# Patient Record
Sex: FEMALE | Race: BLACK OR AFRICAN AMERICAN | Employment: UNEMPLOYED | URBAN - METROPOLITAN AREA
[De-identification: names, ages, dates, MRNs, and addresses within clinical notes are randomized per-mention and may not be internally consistent; named-entity substitution may affect disease eponyms.]

---

## 2020-03-31 ENCOUNTER — APPOINTMENT (OUTPATIENT)
Dept: CT IMAGING | Age: 60
DRG: 871 | End: 2020-03-31
Payer: COMMERCIAL

## 2020-03-31 ENCOUNTER — APPOINTMENT (OUTPATIENT)
Dept: GENERAL RADIOLOGY | Age: 60
DRG: 871 | End: 2020-03-31
Payer: COMMERCIAL

## 2020-03-31 ENCOUNTER — HOSPITAL ENCOUNTER (INPATIENT)
Age: 60
LOS: 9 days | Discharge: HOME | DRG: 871 | End: 2020-04-09
Attending: EMERGENCY MEDICINE | Admitting: INTERNAL MEDICINE
Payer: COMMERCIAL

## 2020-03-31 PROBLEM — R09.02 HYPOXIA: Status: ACTIVE | Noted: 2020-03-31

## 2020-03-31 PROBLEM — E87.1 HYPONATREMIA: Status: ACTIVE | Noted: 2020-03-31

## 2020-03-31 PROBLEM — J18.9 MULTIFOCAL PNEUMONIA: Status: ACTIVE | Noted: 2020-03-31

## 2020-03-31 PROBLEM — E11.9 TYPE 2 DIABETES MELLITUS WITHOUT COMPLICATION, WITHOUT LONG-TERM CURRENT USE OF INSULIN (HCC): Status: ACTIVE | Noted: 2020-03-31

## 2020-03-31 LAB
-: NORMAL
ABSOLUTE EOS #: 0 K/UL (ref 0–0.4)
ABSOLUTE IMMATURE GRANULOCYTE: 0 K/UL (ref 0–0.3)
ABSOLUTE LYMPH #: 1.5 K/UL (ref 1–4.8)
ABSOLUTE MONO #: 0.07 K/UL (ref 0.1–0.8)
ADENOVIRUS PCR: NOT DETECTED
ALBUMIN SERPL-MCNC: 3.6 G/DL (ref 3.5–5.2)
ALBUMIN/GLOBULIN RATIO: 0.8 (ref 1–2.5)
ALP BLD-CCNC: 92 U/L (ref 35–104)
ALT SERPL-CCNC: 19 U/L (ref 5–33)
AMORPHOUS: NORMAL
ANION GAP SERPL CALCULATED.3IONS-SCNC: 17 MMOL/L (ref 9–17)
AST SERPL-CCNC: 35 U/L
BACTERIA: NORMAL
BASOPHILS # BLD: 0 % (ref 0–2)
BASOPHILS ABSOLUTE: 0 K/UL (ref 0–0.2)
BILIRUB SERPL-MCNC: 0.35 MG/DL (ref 0.3–1.2)
BILIRUBIN URINE: NEGATIVE
BORDETELLA PARAPERTUSSIS: NOT DETECTED
BORDETELLA PERTUSSIS PCR: NOT DETECTED
BUN BLDV-MCNC: 9 MG/DL (ref 8–23)
BUN/CREAT BLD: ABNORMAL (ref 9–20)
C-REACTIVE PROTEIN: 113.1 MG/L (ref 0–5)
CALCIUM SERPL-MCNC: 9.5 MG/DL (ref 8.6–10.4)
CASTS UA: NORMAL /LPF (ref 0–8)
CHLAMYDIA PNEUMONIAE BY PCR: NOT DETECTED
CHLORIDE BLD-SCNC: 90 MMOL/L (ref 98–107)
CO2: 22 MMOL/L (ref 20–31)
COLOR: YELLOW
COMMENT UA: ABNORMAL
CORONAVIRUS 229E PCR: NOT DETECTED
CORONAVIRUS HKU1 PCR: NOT DETECTED
CORONAVIRUS NL63 PCR: NOT DETECTED
CORONAVIRUS OC43 PCR: NOT DETECTED
CREAT SERPL-MCNC: 0.64 MG/DL (ref 0.5–0.9)
CRYSTALS, UA: NORMAL /HPF
DIFFERENTIAL TYPE: ABNORMAL
EOSINOPHILS RELATIVE PERCENT: 0 % (ref 1–4)
EPITHELIAL CELLS UA: NORMAL /HPF (ref 0–5)
FERRITIN: 1205 UG/L (ref 13–150)
GFR AFRICAN AMERICAN: >60 ML/MIN
GFR NON-AFRICAN AMERICAN: >60 ML/MIN
GFR SERPL CREATININE-BSD FRML MDRD: ABNORMAL ML/MIN/{1.73_M2}
GFR SERPL CREATININE-BSD FRML MDRD: ABNORMAL ML/MIN/{1.73_M2}
GLUCOSE BLD-MCNC: 276 MG/DL (ref 65–105)
GLUCOSE BLD-MCNC: 332 MG/DL (ref 70–99)
GLUCOSE URINE: ABNORMAL
HCT VFR BLD CALC: 46.3 % (ref 36.3–47.1)
HEMOGLOBIN: 15.2 G/DL (ref 11.9–15.1)
HUMAN METAPNEUMOVIRUS PCR: NOT DETECTED
IMMATURE GRANULOCYTES: 0 %
INFLUENZA A BY PCR: NOT DETECTED
INFLUENZA A H1 (2009) PCR: NORMAL
INFLUENZA A H1 PCR: NORMAL
INFLUENZA A H3 PCR: NORMAL
INFLUENZA B BY PCR: NOT DETECTED
KETONES, URINE: ABNORMAL
LACTIC ACID, SEPSIS WHOLE BLOOD: 2.3 MMOL/L (ref 0.5–1.9)
LACTIC ACID, SEPSIS WHOLE BLOOD: 3.6 MMOL/L (ref 0.5–1.9)
LACTIC ACID, SEPSIS: ABNORMAL MMOL/L (ref 0.5–1.9)
LACTIC ACID, SEPSIS: ABNORMAL MMOL/L (ref 0.5–1.9)
LEUKOCYTE ESTERASE, URINE: NEGATIVE
LYMPHOCYTES # BLD: 23 % (ref 24–44)
MCH RBC QN AUTO: 31.7 PG (ref 25.2–33.5)
MCHC RBC AUTO-ENTMCNC: 32.8 G/DL (ref 28.4–34.8)
MCV RBC AUTO: 96.7 FL (ref 82.6–102.9)
MONOCYTES # BLD: 1 % (ref 1–7)
MORPHOLOGY: NORMAL
MUCUS: NORMAL
MYCOPLASMA PNEUMONIAE PCR: NOT DETECTED
NITRITE, URINE: NEGATIVE
NRBC AUTOMATED: 0 PER 100 WBC
OTHER OBSERVATIONS UA: NORMAL
PARAINFLUENZA 1 PCR: NOT DETECTED
PARAINFLUENZA 2 PCR: NOT DETECTED
PARAINFLUENZA 3 PCR: NOT DETECTED
PARAINFLUENZA 4 PCR: NOT DETECTED
PDW BLD-RTO: 11 % (ref 11.8–14.4)
PH UA: 7 (ref 5–8)
PLATELET # BLD: 242 K/UL (ref 138–453)
PLATELET ESTIMATE: ABNORMAL
PMV BLD AUTO: 10.8 FL (ref 8.1–13.5)
POTASSIUM SERPL-SCNC: 4.4 MMOL/L (ref 3.7–5.3)
PROCALCITONIN: 0.19 NG/ML
PROTEIN UA: ABNORMAL
RBC # BLD: 4.79 M/UL (ref 3.95–5.11)
RBC # BLD: ABNORMAL 10*6/UL
RBC UA: NORMAL /HPF (ref 0–4)
RENAL EPITHELIAL, UA: NORMAL /HPF
RESP SYNCYTIAL VIRUS PCR: NOT DETECTED
RHINO/ENTEROVIRUS PCR: NOT DETECTED
SEG NEUTROPHILS: 76 % (ref 36–66)
SEGMENTED NEUTROPHILS ABSOLUTE COUNT: 4.93 K/UL (ref 1.8–7.7)
SODIUM BLD-SCNC: 129 MMOL/L (ref 135–144)
SPECIFIC GRAVITY UA: 1.05 (ref 1–1.03)
SPECIMEN DESCRIPTION: NORMAL
TOTAL PROTEIN: 8.3 G/DL (ref 6.4–8.3)
TRICHOMONAS: NORMAL
TROPONIN INTERP: NORMAL
TROPONIN T: NORMAL NG/ML
TROPONIN, HIGH SENSITIVITY: <6 NG/L (ref 0–14)
TURBIDITY: CLEAR
URINE HGB: NEGATIVE
UROBILINOGEN, URINE: NORMAL
WBC # BLD: 6.5 K/UL (ref 3.5–11.3)
WBC # BLD: ABNORMAL 10*3/UL
WBC UA: NORMAL /HPF (ref 0–5)
YEAST: NORMAL

## 2020-03-31 PROCEDURE — 80053 COMPREHEN METABOLIC PANEL: CPT

## 2020-03-31 PROCEDURE — 2060000000 HC ICU INTERMEDIATE R&B

## 2020-03-31 PROCEDURE — 86140 C-REACTIVE PROTEIN: CPT

## 2020-03-31 PROCEDURE — 87205 SMEAR GRAM STAIN: CPT

## 2020-03-31 PROCEDURE — 99223 1ST HOSP IP/OBS HIGH 75: CPT | Performed by: NURSE PRACTITIONER

## 2020-03-31 PROCEDURE — 71260 CT THORAX DX C+: CPT

## 2020-03-31 PROCEDURE — 81001 URINALYSIS AUTO W/SCOPE: CPT

## 2020-03-31 PROCEDURE — 2580000003 HC RX 258: Performed by: STUDENT IN AN ORGANIZED HEALTH CARE EDUCATION/TRAINING PROGRAM

## 2020-03-31 PROCEDURE — 0100U HC RESPIRPTHGN MULT REV TRANS & AMP PRB TECH 21 TRGT: CPT

## 2020-03-31 PROCEDURE — 85025 COMPLETE CBC W/AUTO DIFF WBC: CPT

## 2020-03-31 PROCEDURE — 6360000004 HC RX CONTRAST MEDICATION: Performed by: STUDENT IN AN ORGANIZED HEALTH CARE EDUCATION/TRAINING PROGRAM

## 2020-03-31 PROCEDURE — 6370000000 HC RX 637 (ALT 250 FOR IP): Performed by: STUDENT IN AN ORGANIZED HEALTH CARE EDUCATION/TRAINING PROGRAM

## 2020-03-31 PROCEDURE — 86738 MYCOPLASMA ANTIBODY: CPT

## 2020-03-31 PROCEDURE — U0002 COVID-19 LAB TEST NON-CDC: HCPCS

## 2020-03-31 PROCEDURE — 99254 IP/OBS CNSLTJ NEW/EST MOD 60: CPT | Performed by: INTERNAL MEDICINE

## 2020-03-31 PROCEDURE — 82947 ASSAY GLUCOSE BLOOD QUANT: CPT

## 2020-03-31 PROCEDURE — 83615 LACTATE (LD) (LDH) ENZYME: CPT

## 2020-03-31 PROCEDURE — 2580000003 HC RX 258: Performed by: PHYSICIAN ASSISTANT

## 2020-03-31 PROCEDURE — 83605 ASSAY OF LACTIC ACID: CPT

## 2020-03-31 PROCEDURE — 6360000002 HC RX W HCPCS: Performed by: PHYSICIAN ASSISTANT

## 2020-03-31 PROCEDURE — 93005 ELECTROCARDIOGRAM TRACING: CPT | Performed by: STUDENT IN AN ORGANIZED HEALTH CARE EDUCATION/TRAINING PROGRAM

## 2020-03-31 PROCEDURE — 84484 ASSAY OF TROPONIN QUANT: CPT

## 2020-03-31 PROCEDURE — 84145 PROCALCITONIN (PCT): CPT

## 2020-03-31 PROCEDURE — 87070 CULTURE OTHR SPECIMN AEROBIC: CPT

## 2020-03-31 PROCEDURE — 71045 X-RAY EXAM CHEST 1 VIEW: CPT

## 2020-03-31 PROCEDURE — 82728 ASSAY OF FERRITIN: CPT

## 2020-03-31 PROCEDURE — 99285 EMERGENCY DEPT VISIT HI MDM: CPT

## 2020-03-31 PROCEDURE — 6370000000 HC RX 637 (ALT 250 FOR IP): Performed by: PHYSICIAN ASSISTANT

## 2020-03-31 RX ORDER — SODIUM CHLORIDE, SODIUM LACTATE, POTASSIUM CHLORIDE, AND CALCIUM CHLORIDE .6; .31; .03; .02 G/100ML; G/100ML; G/100ML; G/100ML
1000 INJECTION, SOLUTION INTRAVENOUS ONCE
Status: COMPLETED | OUTPATIENT
Start: 2020-03-31 | End: 2020-03-31

## 2020-03-31 RX ORDER — NICOTINE 21 MG/24HR
1 PATCH, TRANSDERMAL 24 HOURS TRANSDERMAL DAILY PRN
Status: DISCONTINUED | OUTPATIENT
Start: 2020-03-31 | End: 2020-04-09 | Stop reason: HOSPADM

## 2020-03-31 RX ORDER — PROMETHAZINE HYDROCHLORIDE 25 MG/1
12.5 TABLET ORAL EVERY 6 HOURS PRN
Status: DISCONTINUED | OUTPATIENT
Start: 2020-03-31 | End: 2020-04-09 | Stop reason: HOSPADM

## 2020-03-31 RX ORDER — ACETAMINOPHEN 325 MG/1
650 TABLET ORAL ONCE
Status: COMPLETED | OUTPATIENT
Start: 2020-03-31 | End: 2020-03-31

## 2020-03-31 RX ORDER — ACETAMINOPHEN 325 MG/1
650 TABLET ORAL EVERY 6 HOURS PRN
Status: DISCONTINUED | OUTPATIENT
Start: 2020-03-31 | End: 2020-04-09 | Stop reason: HOSPADM

## 2020-03-31 RX ORDER — POTASSIUM CHLORIDE 20 MEQ/1
40 TABLET, EXTENDED RELEASE ORAL PRN
Status: DISCONTINUED | OUTPATIENT
Start: 2020-03-31 | End: 2020-04-09 | Stop reason: HOSPADM

## 2020-03-31 RX ORDER — IPRATROPIUM BROMIDE AND ALBUTEROL SULFATE 2.5; .5 MG/3ML; MG/3ML
1 SOLUTION RESPIRATORY (INHALATION)
Status: DISCONTINUED | OUTPATIENT
Start: 2020-03-31 | End: 2020-04-02

## 2020-03-31 RX ORDER — SODIUM CHLORIDE 0.9 % (FLUSH) 0.9 %
10 SYRINGE (ML) INJECTION PRN
Status: DISCONTINUED | OUTPATIENT
Start: 2020-03-31 | End: 2020-04-09 | Stop reason: HOSPADM

## 2020-03-31 RX ORDER — HYDROXYCHLOROQUINE SULFATE 200 MG/1
400 TABLET, FILM COATED ORAL 2 TIMES DAILY
Status: DISCONTINUED | OUTPATIENT
Start: 2020-03-31 | End: 2020-04-01

## 2020-03-31 RX ORDER — ACETAMINOPHEN 650 MG/1
650 SUPPOSITORY RECTAL EVERY 6 HOURS PRN
Status: DISCONTINUED | OUTPATIENT
Start: 2020-03-31 | End: 2020-04-09 | Stop reason: HOSPADM

## 2020-03-31 RX ORDER — ONDANSETRON 2 MG/ML
4 INJECTION INTRAMUSCULAR; INTRAVENOUS EVERY 6 HOURS PRN
Status: DISCONTINUED | OUTPATIENT
Start: 2020-03-31 | End: 2020-04-09 | Stop reason: HOSPADM

## 2020-03-31 RX ORDER — ALBUTEROL SULFATE 2.5 MG/3ML
2.5 SOLUTION RESPIRATORY (INHALATION)
Status: DISCONTINUED | OUTPATIENT
Start: 2020-03-31 | End: 2020-04-03

## 2020-03-31 RX ORDER — HYDROXYCHLOROQUINE SULFATE 200 MG/1
200 TABLET, FILM COATED ORAL 2 TIMES DAILY
Status: COMPLETED | OUTPATIENT
Start: 2020-04-02 | End: 2020-04-05

## 2020-03-31 RX ORDER — SODIUM CHLORIDE 0.9 % (FLUSH) 0.9 %
10 SYRINGE (ML) INJECTION EVERY 12 HOURS SCHEDULED
Status: DISCONTINUED | OUTPATIENT
Start: 2020-03-31 | End: 2020-04-09 | Stop reason: HOSPADM

## 2020-03-31 RX ORDER — MAGNESIUM SULFATE 1 G/100ML
1 INJECTION INTRAVENOUS PRN
Status: DISCONTINUED | OUTPATIENT
Start: 2020-03-31 | End: 2020-04-09 | Stop reason: HOSPADM

## 2020-03-31 RX ORDER — POTASSIUM CHLORIDE 7.45 MG/ML
10 INJECTION INTRAVENOUS PRN
Status: DISCONTINUED | OUTPATIENT
Start: 2020-03-31 | End: 2020-04-09 | Stop reason: HOSPADM

## 2020-03-31 RX ORDER — SODIUM CHLORIDE 9 MG/ML
INJECTION, SOLUTION INTRAVENOUS CONTINUOUS
Status: DISCONTINUED | OUTPATIENT
Start: 2020-03-31 | End: 2020-04-03

## 2020-03-31 RX ADMIN — ACETAMINOPHEN 650 MG: 325 TABLET ORAL at 15:27

## 2020-03-31 RX ADMIN — SODIUM CHLORIDE, POTASSIUM CHLORIDE, SODIUM LACTATE AND CALCIUM CHLORIDE 1000 ML: 600; 310; 30; 20 INJECTION, SOLUTION INTRAVENOUS at 14:58

## 2020-03-31 RX ADMIN — ENOXAPARIN SODIUM 40 MG: 40 INJECTION SUBCUTANEOUS at 21:13

## 2020-03-31 RX ADMIN — ACETAMINOPHEN 650 MG: 325 TABLET ORAL at 19:03

## 2020-03-31 RX ADMIN — AZITHROMYCIN MONOHYDRATE 500 MG: 500 INJECTION, POWDER, LYOPHILIZED, FOR SOLUTION INTRAVENOUS at 18:48

## 2020-03-31 RX ADMIN — INSULIN LISPRO 3 UNITS: 100 INJECTION, SOLUTION INTRAVENOUS; SUBCUTANEOUS at 18:59

## 2020-03-31 RX ADMIN — SODIUM CHLORIDE: 9 INJECTION, SOLUTION INTRAVENOUS at 18:00

## 2020-03-31 RX ADMIN — IOHEXOL 75 ML: 350 INJECTION, SOLUTION INTRAVENOUS at 15:18

## 2020-03-31 ASSESSMENT — ENCOUNTER SYMPTOMS
COLOR CHANGE: 0
PHOTOPHOBIA: 0
ABDOMINAL PAIN: 0
SHORTNESS OF BREATH: 1
BACK PAIN: 0
SORE THROAT: 0
NAUSEA: 0
VOMITING: 0
COUGH: 1

## 2020-03-31 ASSESSMENT — PAIN DESCRIPTION - LOCATION
LOCATION: GENERALIZED
LOCATION: HEAD
LOCATION: HEAD

## 2020-03-31 ASSESSMENT — PAIN SCALES - GENERAL
PAINLEVEL_OUTOF10: 1
PAINLEVEL_OUTOF10: 6
PAINLEVEL_OUTOF10: 7
PAINLEVEL_OUTOF10: 1

## 2020-03-31 ASSESSMENT — PAIN DESCRIPTION - DESCRIPTORS: DESCRIPTORS: ACHING

## 2020-03-31 NOTE — ED PROVIDER NOTES
[facility]. 2  Emergency Department Encounter  EmergencyMedicine Resident     Pt Name:Marie Cadet  MRN: [id number]  [place] 1960  Date of evaluation: 3/31/20  PCP:  Ruddy Batitsa MD    CHIEF COMPLAINT       Chief Complaint   Patient presents with    Cough    Fever    Shortness of Breath       HISTORY OF PRESENT ILLNESS  (Location/Symptom, Timing/Onset, Context/Setting, Quality, Duration, Modifying Factors, Severity.)      Marie Padron is a 60 y.o. female Patient complaining of 1 week of cough, fever, myalgias. No known sick contacts, no recent travel. Patient denies any nausea, vomiting, diarrhea. No history of COPD or other lung disease. Patient denying any cardiac history. She takes metformin for type 2 diabetes. Recently called PCP and was prescribed albuterol. She states that the albuterol does help her symptoms. Patient did have a dose of albuterol prior to coming in. PAST MEDICAL / SURGICAL / SOCIAL / FAMILY HISTORY      has a past medical history of Diabetes mellitus ([facility] [address] has no past surgical history on file.     Social History     Socioeconomic History    Marital status:      Spouse name: Not on file    Number of children: Not on file    Years of education: Not on file    Highest education level: Not on file   Occupational History    Not on file   Social Needs    Financial resource strain: Not on file    Food insecurity     Worry: Not on file     Inability: Not on file    Transportation needs     Medical: Not on file     Non-medical: Not on file   Tobacco Use    Smoking status: Never Smoker    Smokeless tobacco: Never Used   Substance and Sexual Activity    Alcohol use: Yes     Comment: occasionally    Drug use: Never    Sexual activity: Not on file   Lifestyle    Physical activity     Days per week: Not on file     Minutes per session: Not on file    Stress: Not on file   Relationships    Social connections     Talks on phone: Not on file     Gets together: Not on file     Attends Jew service: Not on file     Active member of club or organization: Not on file     Attends meetings of clubs or organizations: Not on file     Relationship status: Not on file    Intimate partner violence     Fear of current or ex partner: Not on file     Emotionally abused: Not on file     Physically abused: Not on file     Forced sexual activity: Not on file   Other Topics Concern    Not on file   Social History Narrative    Not on file       History reviewed. No pertinent family history. Allergies:  Patient has no known allergies. Home Medications:  Prior to Admission medications    Medication Sig Start Date End Date Taking? Authorizing Provider   metFORMIN (GLUCOPHAGE) 1000 MG tablet Take 1,000 mg by mouth daily (with breakfast)   Yes Historical Provider, MD       REVIEW OF SYSTEMS    (2-9 systems for level 4, 10 or more for level 5)      Review of Systems   Constitutional: Positive for fever. Respiratory: Positive for cough and shortness of breath. Cardiovascular: Negative for chest pain. Gastrointestinal: Negative for abdominal pain, nausea and vomiting. Genitourinary: Negative for dysuria. Musculoskeletal: Negative for myalgias. Skin: Negative for rash. Allergic/Immunologic: Negative for environmental allergies. Neurological: Negative for headaches. Hematological: Does not bruise/bleed easily. Psychiatric/Behavioral: Negative for suicidal ideas. PHYSICAL EXAM   (up to 7 for level 4, 8 or more for level 5)      INITIAL VITALS:   /81   Pulse 94   Temp 100.7 °F (38.2 °C) (Oral)   Resp 29   Ht 5' 7\" (1.702 m)   Wt 137 lb (62.1 kg)   SpO2 98%   BMI 21.46 kg/m²     Physical Exam  Constitutional:       General: She is in acute distress. Appearance: She is well-developed. She is ill-appearing. HENT:      Head: Normocephalic and atraumatic.       Nose: Nose normal.   Eyes:      Pupils: Pupils are equal, round, and reactive to light. Neck:      Musculoskeletal: Normal range of motion and neck supple. Cardiovascular:      Rate and Rhythm: Normal rate and regular rhythm. Heart sounds: Normal heart sounds. Pulmonary:      Effort: No respiratory distress. Comments: Tachypnea w rate or 36  Abdominal:      General: Bowel sounds are normal. There is no distension. Palpations: Abdomen is soft. Tenderness: There is no abdominal tenderness. Musculoskeletal: Normal range of motion. General: No deformity. Skin:     General: Skin is warm and dry. Findings: No erythema or rash. Neurological:      Mental Status: She is alert and oriented to person, place, and time. Deep Tendon Reflexes: Reflexes are normal and symmetric.    Psychiatric:         Behavior: Behavior normal.         DIFFERENTIAL  DIAGNOSIS     PLAN (LABS / IMAGING / EKG):  Orders Placed This Encounter   Procedures    Sputum gram stain    Respiratory Culture    Culture, Blood 1    Culture, Blood 1    Respiratory Virus PCR Panel    CT CHEST PULMONARY EMBOLISM W CONTRAST    XR CHEST PORTABLE    CBC Auto Differential    C-Reactive Protein    Troponin    Lactate, Sepsis    Procalcitonin    Urinalysis Reflex to Culture    Comprehensive Metabolic Panel    Basic Metabolic Panel w/ Reflex to MG    CBC Auto Differential    Microscopic Urinalysis    Ferritin    Lactate Dehydrogenase    Mycoplasma pneumoniae antibody, IgM    DIET CARB CONTROL;    Telemetry monitoring    Continuous Pulse Oximetry    Vital signs per unit routine    Notify physician    Up as tolerated    Daily weights    Intake and output    Tobacco cessation education protocol    Encourage deep breathing and coughing every two hours while awake    Place intermittent pneumatic compression device    Full Code    Inpatient consult to Hospitalist    Consult to Infectious Disease    Consult to Pulmonology    Droplet Plus Isolation    OT eval and treat    PT evaluation and treat    Initiate Oxygen Therapy Protocol    Pulse Oximetry Spot Check    Respiratory care evaluation only    HHN Treatment    HHN Treatment    POC Glucose Fingerstick    EKG 12 Lead    PATIENT STATUS (FROM ED OR OR/PROCEDURAL) Inpatient    PATIENT STATUS (FROM ED OR OR/PROCEDURAL) Inpatient       MEDICATIONS ORDERED:  Orders Placed This Encounter   Medications    lactated ringers bolus    iohexol (OMNIPAQUE 350) solution 75 mL    acetaminophen (TYLENOL) tablet 650 mg    insulin lispro (HUMALOG) injection vial 0-6 Units    insulin lispro (HUMALOG) injection vial 0-3 Units    magnesium sulfate 1 g in dextrose 5% 100 mL IVPB    OR Linked Order Group     potassium chloride (KLOR-CON M) extended release tablet 40 mEq     potassium bicarb-citric acid (EFFER-K) effervescent tablet 40 mEq     potassium chloride 10 mEq/100 mL IVPB (Peripheral Line)    0.9 % sodium chloride infusion    sodium chloride flush 0.9 % injection 10 mL    sodium chloride flush 0.9 % injection 10 mL    OR Linked Order Group     acetaminophen (TYLENOL) tablet 650 mg     acetaminophen (TYLENOL) suppository 650 mg    magnesium hydroxide (MILK OF MAGNESIA) 400 MG/5ML suspension 30 mL    OR Linked Order Group     promethazine (PHENERGAN) tablet 12.5 mg     ondansetron (ZOFRAN) injection 4 mg    nicotine (NICODERM CQ) 21 MG/24HR 1 patch     If indicated/pateint smokes    enoxaparin (LOVENOX) injection 40 mg    albuterol (PROVENTIL) nebulizer solution 2.5 mg    ipratropium-albuterol (DUONEB) nebulizer solution 1 ampule    AND Linked Order Group     azithromycin (ZITHROMAX) 500 mg in dextrose 5% 250 mL IVPB     cefTRIAXone (ROCEPHIN) 1 g IVPB in 50 mL D5W minibag      IVPB       IMPRESSION:     ED Course as of Mar 31 1939   Tue Mar 31, 2020       1328 Patient febrile    [AD]   0312 Patient with low-grade temp of 100.2. Patient coughing on exam.  O2 sats 94 to 96% on room air.   Patient slightly tachycardic likely due to dose of albuterol prior to coming to ED. Patient is breathing rapidly at 37 breaths/min. We will continue to monitor closely and obtain CT of the chest, basic labs, EKG. Will consider possible admission for suspected coronavirus as pt was hypoxic w sat 86 upon arrival.    [AD]   1416 Pt placed on 2L NC with O2 sat of 91%. [AD]   7356 Pt admitted to intermed service for highly suspected COVID infection. Pt has not required additional respiratory support. CT showing viral pattern w ground glass opacities in b/l lower lobes.      [AD]      ED Course User Index  [AD] Nathalia Chacon MD       DIAGNOSTIC RESULTS / EMERGENCY DEPARTMENT COURSE / MDM     LABS:  Results for orders placed or performed during the hospital encounter of 03/31/20   CBC Auto Differential   Result Value Ref Range    WBC 6.5 3.5 - 11.3 k/uL    RBC 4.79 3.95 - 5.11 m/uL    Hemoglobin 15.2 (H) 11.9 - 15.1 g/dL    Hematocrit 46.3 36.3 - 47.1 %    MCV 96.7 82.6 - 102.9 fL    MCH 31.7 25.2 - 33.5 pg    MCHC 32.8 28.4 - 34.8 g/dL    RDW 11.0 (L) 11.8 - 14.4 %    Platelets 439 778 - 781 k/uL    MPV 10.8 8.1 - 13.5 fL    NRBC Automated 0.0 0.0 per 100 WBC    Differential Type NOT REPORTED     WBC Morphology NOT REPORTED     RBC Morphology NOT REPORTED     Platelet Estimate NOT REPORTED     Immature Granulocytes 0 0 %    Seg Neutrophils 76 (H) 36 - 66 %    Lymphocytes 23 (L) 24 - 44 %    Monocytes 1 1 - 7 %    Eosinophils % 0 (L) 1 - 4 %    Basophils 0 0 - 2 %    Absolute Immature Granulocyte 0.00 0.00 - 0.30 k/uL    Segs Absolute 4.93 1.8 - 7.7 k/uL    Absolute Lymph # 1.50 1.0 - 4.8 k/uL    Absolute Mono # 0.07 (L) 0.1 - 0.8 k/uL    Absolute Eos # 0.00 0.0 - 0.4 k/uL    Basophils Absolute 0.00 0.0 - 0.2 k/uL    Morphology Normal    C-Reactive Protein   Result Value Ref Range    .1 (H) 0.0 - 5.0 mg/L   Troponin   Result Value Ref Range    Troponin, High Sensitivity <6 0 - 14 ng/L    Troponin T NOT REPORTED

## 2020-03-31 NOTE — LETTER
[facility]  [id number] [id number]  Phone: [phone]           Outpatient Diabetes Education Program     4/9/20    RE: Clearance Peter Padron  1960  [address] [place], [place] [address] [facility] [place] [address]        Dear Leida Parra MD    As a follow up to a [facility] admission for diabetes related illness or inpatient diabetes education referral, please consider ordering an outpatient diabetes education / MNT to address ongoing diabetes self management needs. Lab Results   Component Value Date    LABA1C 14.2 (H) 04/08/2020    LABA1C 11.8 (H) 11/05/2013       Please place order via [employer]/ mymxlog system:   REF20 - Ambulatory referral to Diabetic Education -  Portia Zamorano  ED      Or   FAX outpatient diabetes education referral to [phone]    Diabetes Education Services  staff will contact the patient to set up assessment and education sessions. Thank you.     Tariq Riggins, RN  CDE

## 2020-04-01 LAB
-: NORMAL
ABSOLUTE EOS #: 0 K/UL (ref 0–0.4)
ABSOLUTE IMMATURE GRANULOCYTE: 0.06 K/UL (ref 0–0.3)
ABSOLUTE LYMPH #: 1.43 K/UL (ref 1–4.8)
ABSOLUTE MONO #: 0.11 K/UL (ref 0.1–0.8)
ANION GAP SERPL CALCULATED.3IONS-SCNC: 13 MMOL/L (ref 9–17)
BASOPHILS # BLD: 0 % (ref 0–2)
BASOPHILS ABSOLUTE: 0 K/UL (ref 0–0.2)
BUN BLDV-MCNC: 6 MG/DL (ref 8–23)
BUN/CREAT BLD: ABNORMAL (ref 9–20)
CALCIUM SERPL-MCNC: 8.3 MG/DL (ref 8.6–10.4)
CHLORIDE BLD-SCNC: 95 MMOL/L (ref 98–107)
CO2: 23 MMOL/L (ref 20–31)
CREAT SERPL-MCNC: 0.5 MG/DL (ref 0.5–0.9)
DIFFERENTIAL TYPE: ABNORMAL
EKG ATRIAL RATE: 102 BPM
EKG ATRIAL RATE: 83 BPM
EKG P AXIS: 50 DEGREES
EKG P AXIS: 52 DEGREES
EKG P-R INTERVAL: 146 MS
EKG P-R INTERVAL: 158 MS
EKG Q-T INTERVAL: 330 MS
EKG Q-T INTERVAL: 368 MS
EKG QRS DURATION: 76 MS
EKG QRS DURATION: 82 MS
EKG QTC CALCULATION (BAZETT): 430 MS
EKG QTC CALCULATION (BAZETT): 432 MS
EKG R AXIS: 18 DEGREES
EKG R AXIS: 51 DEGREES
EKG T AXIS: 39 DEGREES
EKG T AXIS: 61 DEGREES
EKG VENTRICULAR RATE: 102 BPM
EKG VENTRICULAR RATE: 83 BPM
EOSINOPHILS RELATIVE PERCENT: 0 % (ref 1–4)
GFR AFRICAN AMERICAN: >60 ML/MIN
GFR NON-AFRICAN AMERICAN: >60 ML/MIN
GFR SERPL CREATININE-BSD FRML MDRD: ABNORMAL ML/MIN/{1.73_M2}
GFR SERPL CREATININE-BSD FRML MDRD: ABNORMAL ML/MIN/{1.73_M2}
GLUCOSE BLD-MCNC: 243 MG/DL (ref 65–105)
GLUCOSE BLD-MCNC: 264 MG/DL (ref 70–99)
GLUCOSE BLD-MCNC: 266 MG/DL (ref 65–105)
GLUCOSE BLD-MCNC: 281 MG/DL (ref 65–105)
HCT VFR BLD CALC: 39.3 % (ref 36.3–47.1)
HEMOGLOBIN: 13 G/DL (ref 11.9–15.1)
IMMATURE GRANULOCYTES: 1 %
INTERVENTION: NORMAL
LACTATE DEHYDROGENASE: 440 U/L (ref 135–214)
LYMPHOCYTES # BLD: 26 % (ref 24–44)
MCH RBC QN AUTO: 31.5 PG (ref 25.2–33.5)
MCHC RBC AUTO-ENTMCNC: 33.1 G/DL (ref 28.4–34.8)
MCV RBC AUTO: 95.2 FL (ref 82.6–102.9)
MONOCYTES # BLD: 2 % (ref 1–7)
MORPHOLOGY: NORMAL
MYCOPLASMA PNEUMONIAE IGM: 0.31
NRBC AUTOMATED: 0 PER 100 WBC
PDW BLD-RTO: 11.1 % (ref 11.8–14.4)
PLATELET # BLD: 230 K/UL (ref 138–453)
PLATELET ESTIMATE: ABNORMAL
PMV BLD AUTO: 10.5 FL (ref 8.1–13.5)
POTASSIUM SERPL-SCNC: 4 MMOL/L (ref 3.7–5.3)
RBC # BLD: 4.13 M/UL (ref 3.95–5.11)
RBC # BLD: ABNORMAL 10*6/UL
REASON FOR REJECTION: NORMAL
SEG NEUTROPHILS: 71 % (ref 36–66)
SEGMENTED NEUTROPHILS ABSOLUTE COUNT: 3.9 K/UL (ref 1.8–7.7)
SODIUM BLD-SCNC: 131 MMOL/L (ref 135–144)
WBC # BLD: 5.5 K/UL (ref 3.5–11.3)
WBC # BLD: ABNORMAL 10*3/UL
ZZ NTE CLEAN UP: ORDERED TEST: NORMAL
ZZ NTE WITH NAME CLEAN UP: SPECIMEN SOURCE: NORMAL

## 2020-04-01 PROCEDURE — 99232 SBSQ HOSP IP/OBS MODERATE 35: CPT | Performed by: INTERNAL MEDICINE

## 2020-04-01 PROCEDURE — 99233 SBSQ HOSP IP/OBS HIGH 50: CPT | Performed by: INTERNAL MEDICINE

## 2020-04-01 PROCEDURE — 2060000000 HC ICU INTERMEDIATE R&B

## 2020-04-01 PROCEDURE — 93010 ELECTROCARDIOGRAM REPORT: CPT | Performed by: INTERNAL MEDICINE

## 2020-04-01 PROCEDURE — 85025 COMPLETE CBC W/AUTO DIFF WBC: CPT

## 2020-04-01 PROCEDURE — 6370000000 HC RX 637 (ALT 250 FOR IP): Performed by: PHYSICIAN ASSISTANT

## 2020-04-01 PROCEDURE — 2580000003 HC RX 258: Performed by: INTERNAL MEDICINE

## 2020-04-01 PROCEDURE — 82947 ASSAY GLUCOSE BLOOD QUANT: CPT

## 2020-04-01 PROCEDURE — 99254 IP/OBS CNSLTJ NEW/EST MOD 60: CPT | Performed by: INTERNAL MEDICINE

## 2020-04-01 PROCEDURE — 80048 BASIC METABOLIC PNL TOTAL CA: CPT

## 2020-04-01 PROCEDURE — 93005 ELECTROCARDIOGRAM TRACING: CPT | Performed by: INTERNAL MEDICINE

## 2020-04-01 PROCEDURE — 6370000000 HC RX 637 (ALT 250 FOR IP): Performed by: INTERNAL MEDICINE

## 2020-04-01 PROCEDURE — 2580000003 HC RX 258: Performed by: PHYSICIAN ASSISTANT

## 2020-04-01 PROCEDURE — 6360000002 HC RX W HCPCS: Performed by: INTERNAL MEDICINE

## 2020-04-01 PROCEDURE — 6360000002 HC RX W HCPCS: Performed by: PHYSICIAN ASSISTANT

## 2020-04-01 RX ORDER — DEXTROSE MONOHYDRATE 50 MG/ML
100 INJECTION, SOLUTION INTRAVENOUS PRN
Status: DISCONTINUED | OUTPATIENT
Start: 2020-04-01 | End: 2020-04-09 | Stop reason: HOSPADM

## 2020-04-01 RX ORDER — NICOTINE POLACRILEX 4 MG
15 LOZENGE BUCCAL PRN
Status: DISCONTINUED | OUTPATIENT
Start: 2020-04-01 | End: 2020-04-09 | Stop reason: HOSPADM

## 2020-04-01 RX ORDER — DEXTROSE MONOHYDRATE 25 G/50ML
12.5 INJECTION, SOLUTION INTRAVENOUS PRN
Status: DISCONTINUED | OUTPATIENT
Start: 2020-04-01 | End: 2020-04-09 | Stop reason: HOSPADM

## 2020-04-01 RX ORDER — GLIPIZIDE 5 MG/1
5 TABLET ORAL
Status: DISCONTINUED | OUTPATIENT
Start: 2020-04-01 | End: 2020-04-09 | Stop reason: HOSPADM

## 2020-04-01 RX ADMIN — ACETAMINOPHEN 650 MG: 325 TABLET ORAL at 00:51

## 2020-04-01 RX ADMIN — HYDROXYCHLOROQUINE SULFATE 400 MG: 200 TABLET, FILM COATED ORAL at 00:51

## 2020-04-01 RX ADMIN — SODIUM CHLORIDE: 9 INJECTION, SOLUTION INTRAVENOUS at 20:26

## 2020-04-01 RX ADMIN — AZITHROMYCIN MONOHYDRATE 500 MG: 500 INJECTION, POWDER, LYOPHILIZED, FOR SOLUTION INTRAVENOUS at 18:15

## 2020-04-01 RX ADMIN — SODIUM CHLORIDE, PRESERVATIVE FREE 10 ML: 5 INJECTION INTRAVENOUS at 08:16

## 2020-04-01 RX ADMIN — SODIUM CHLORIDE: 9 INJECTION, SOLUTION INTRAVENOUS at 06:23

## 2020-04-01 RX ADMIN — CEFTRIAXONE SODIUM 2 G: 2 INJECTION, POWDER, FOR SOLUTION INTRAMUSCULAR; INTRAVENOUS at 12:17

## 2020-04-01 RX ADMIN — ACETAMINOPHEN 650 MG: 325 TABLET ORAL at 06:18

## 2020-04-01 RX ADMIN — ACETAMINOPHEN 650 MG: 325 TABLET ORAL at 20:21

## 2020-04-01 RX ADMIN — HYDROXYCHLOROQUINE SULFATE 400 MG: 200 TABLET, FILM COATED ORAL at 08:16

## 2020-04-01 RX ADMIN — ACETAMINOPHEN 650 MG: 325 TABLET ORAL at 13:52

## 2020-04-01 RX ADMIN — INSULIN LISPRO 3 UNITS: 100 INJECTION, SOLUTION INTRAVENOUS; SUBCUTANEOUS at 12:30

## 2020-04-01 RX ADMIN — INSULIN LISPRO 3 UNITS: 100 INJECTION, SOLUTION INTRAVENOUS; SUBCUTANEOUS at 18:14

## 2020-04-01 RX ADMIN — ENOXAPARIN SODIUM 40 MG: 40 INJECTION SUBCUTANEOUS at 08:16

## 2020-04-01 RX ADMIN — INSULIN LISPRO 2 UNITS: 100 INJECTION, SOLUTION INTRAVENOUS; SUBCUTANEOUS at 21:51

## 2020-04-01 RX ADMIN — INSULIN LISPRO 1 UNITS: 100 INJECTION, SOLUTION INTRAVENOUS; SUBCUTANEOUS at 00:49

## 2020-04-01 ASSESSMENT — ENCOUNTER SYMPTOMS
COUGH: 1
COLOR CHANGE: 0
SHORTNESS OF BREATH: 1
ABDOMINAL PAIN: 0
SORE THROAT: 0
PHOTOPHOBIA: 0
BACK PAIN: 0
EYE ITCHING: 0

## 2020-04-01 ASSESSMENT — PAIN SCALES - GENERAL
PAINLEVEL_OUTOF10: 3
PAINLEVEL_OUTOF10: 2
PAINLEVEL_OUTOF10: 0

## 2020-04-01 NOTE — PLAN OF CARE
Problem: Falls - Risk of:  Goal: Will remain free from falls  Description: Will remain free from falls  Outcome: Ongoing  Goal: Absence of physical injury  Description: Absence of physical injury  Outcome: Ongoing     Problem: Breathing Pattern - Ineffective:  Goal: Ability to achieve and maintain a regular respiratory rate will improve  Description: Ability to achieve and maintain a regular respiratory rate will improve  Outcome: Ongoing     Problem: Anxiety:  Goal: Level of anxiety will decrease  Description: Level of anxiety will decrease  Outcome: Ongoing

## 2020-04-01 NOTE — PROGRESS NOTES
for intubation in these cases give rapid rate of decompensation. Patient is agreeable to intubate should her condition worsen and respiratory status become compromised. Medications: Allergies:  No Known Allergies    Current Meds:   Scheduled Meds:    insulin lispro  0-6 Units Subcutaneous TID WC    insulin lispro  0-3 Units Subcutaneous Nightly    sodium chloride flush  10 mL Intravenous 2 times per day    enoxaparin  40 mg Subcutaneous Daily    ipratropium-albuterol  1 ampule Inhalation Q4H WA    azithromycin  500 mg Intravenous Q24H    [START ON 2020] hydroxychloroquine  200 mg Oral BID    hydroxychloroquine  400 mg Oral BID     Continuous Infusions:    sodium chloride 75 mL/hr at 20 0623     PRN Meds: magnesium sulfate, potassium chloride **OR** potassium alternative oral replacement **OR** potassium chloride, sodium chloride flush, acetaminophen **OR** acetaminophen, magnesium hydroxide, promethazine **OR** ondansetron, nicotine, albuterol    Data:     Past Medical History:   has a past medical history of Diabetes mellitus ([facility] [address] Social History:   reports that she has never smoked. She has never used smokeless tobacco. She reports current alcohol use. She reports that she does not use drugs. Family History: History reviewed. No pertinent family history. Vitals:  /70   Pulse 84   Temp 99.1 °F (37.3 °C) (Oral)   Resp 18   Ht 5' 9\" (1.753 m)   Wt 170 lb (77.1 kg)   SpO2 100%   BMI 25.10 kg/m²   Temp (24hrs), Av.6 °F (38.1 °C), Min:99.1 °F (37.3 °C), Max:103.3 °F (39.6 °C)    Recent Labs     20  1855 20  0043   POCGLU 276* 243*       I/O (24Hr):     Intake/Output Summary (Last 24 hours) at 2020 1113  Last data filed at 2020 0823  Gross per 24 hour   Intake 1150 ml   Output 300 ml   Net 850 ml       Labs:  Hematology:  Recent Labs     20  1415 20  0915   WBC 6.5 5.5   RBC 4.79 4.13   HGB 15.2* 13.0   HCT 46.3 39.3   MCV 96.7 95.2

## 2020-04-01 NOTE — H&P
100 WBC    Differential Type NOT REPORTED     WBC Morphology NOT REPORTED     RBC Morphology NOT REPORTED     Platelet Estimate NOT REPORTED     Immature Granulocytes 0 0 %    Seg Neutrophils 76 (H) 36 - 66 %    Lymphocytes 23 (L) 24 - 44 %    Monocytes 1 1 - 7 %    Eosinophils % 0 (L) 1 - 4 %    Basophils 0 0 - 2 %    Absolute Immature Granulocyte 0.00 0.00 - 0.30 k/uL    Segs Absolute 4.93 1.8 - 7.7 k/uL    Absolute Lymph # 1.50 1.0 - 4.8 k/uL    Absolute Mono # 0.07 (L) 0.1 - 0.8 k/uL    Absolute Eos # 0.00 0.0 - 0.4 k/uL    Basophils Absolute 0.00 0.0 - 0.2 k/uL    Morphology Normal    C-Reactive Protein    Collection Time: 03/31/20  2:15 PM   Result Value Ref Range    .1 (H) 0.0 - 5.0 mg/L   Troponin    Collection Time: 03/31/20  2:15 PM   Result Value Ref Range    Troponin, High Sensitivity <6 0 - 14 ng/L    Troponin T NOT REPORTED <0.03 ng/mL    Troponin Interp NOT REPORTED    Lactate, Sepsis    Collection Time: 03/31/20  2:15 PM   Result Value Ref Range    Lactic Acid, Sepsis NOT REPORTED 0.5 - 1.9 mmol/L    Lactic Acid, Sepsis, Whole Blood 3.6 (H) 0.5 - 1.9 mmol/L   Procalcitonin    Collection Time: 03/31/20  2:15 PM   Result Value Ref Range    Procalcitonin 0.19 (H) <0.09 ng/mL   Comprehensive Metabolic Panel    Collection Time: 03/31/20  2:15 PM   Result Value Ref Range    Glucose 332 (H) 70 - 99 mg/dL    BUN 9 8 - 23 mg/dL    CREATININE 0.64 0.50 - 0.90 mg/dL    Bun/Cre Ratio NOT REPORTED 9 - 20    Calcium 9.5 8.6 - 10.4 mg/dL    Sodium 129 (L) 135 - 144 mmol/L    Potassium 4.4 3.7 - 5.3 mmol/L    Chloride 90 (L) 98 - 107 mmol/L    CO2 22 20 - 31 mmol/L    Anion Gap 17 9 - 17 mmol/L    Alkaline Phosphatase 92 35 - 104 U/L    ALT 19 5 - 33 U/L    AST 35 (H) <32 U/L    Total Bilirubin 0.35 0.3 - 1.2 mg/dL    Total Protein 8.3 6.4 - 8.3 g/dL    Alb 3.6 3.5 - 5.2 g/dL    Albumin/Globulin Ratio 0.8 (L) 1.0 - 2.5    GFR Non-African American >60 >60 mL/min    GFR African American >60 >60 mL/min    GFR evidence for embolism or right heart strain. Multifocal ill-defined ground-glass and interstitial opacities within the lower lobes bilaterally and inferior segments of the upper lobes. No septated cavitation, lymphadenopathy, pleural effusions. Findings are nonspecific, however are concerning for atypical/viral pneumonitis. Assessment :      Hospital Problems           Last Modified POA    Multifocal pneumonia 3/31/2020 Yes    Hypoxia 3/31/2020 Yes    Type 2 diabetes mellitus without complication, without long-term current use of insulin ([facility] [address] 3/31/2020 Yes    Hyponatremia 3/31/2020 Yes          Plan:     Patient status inpatient in the Progressive Unit/Step down    1. Multifocal PNA-- concerns for viral PNA on CT chest, on azithromycin and ceftriaxone, lactic acid elevated; continue with gentle fluid hydration  2. Hypoxia-- sats in low 80s on arrival, oxygen in place prn per protocol  3. DMII-- sliding scale insulin for glycemic control  4. Hyponatremia-- gentle IV fluids and repeat BMP  5. Given CT findings with concern for viral/multifocal PNA and lab findings as mentioned above, Infectious Disease consulted with need for COVID-19 testing. Respiratory viral pathogen panel pending and COVID-19 pending  6. AM labs and trend  7. Oxygen therapy prn per protocol  8. With concerns for COVID-19 Writer did discuss potential for decompensation and possible progression to respiratory failure which would require mechanical ventilation and intubation. Patient is in agreement with intubation should decompensation occur and respiratory status become compromised  9.  POC discussed with patient and RN, continue to monitor   Consultations:   IP CONSULT TO HOSPITALIST  IP CONSULT TO INFECTIOUS DISEASES  IP CONSULT TO PULMONOLOGY    Patient is admitted as inpatient status because of co-morbidities listed above, severity of signs and symptoms as outlined, requirement for current medical therapies and most importantly

## 2020-04-01 NOTE — PROGRESS NOTES
[facility]  Occupational Therapy Not Seen Note    DATE: 2020  Name: José Miguel Linares  : 1960  MRN: [id number]    Patient not available for Occupational Therapy due to:    [] Testing:    [] Hemodialysis    [] Blood Transfusion in Progress    []Refusal by Patient:    [] Surgery/Procedure:    [] Strict Bedrest    [] Sedation    [] Spine Precautions     [] Pt being transferred to palliative care at this time. Spoke with pt/family and OT services to be defered. [x] Pt independent with functional mobility and ADLs. Pt with no OT acute care needs at this time, will defer OT eval. Re-consult if deficits arise.      [] Other    Next Scheduled Treatment:    Aaliyah John OTR/L

## 2020-04-01 NOTE — CONSULTS
Left message on pt cell VM to return call regarding message below  Called pt home number, line is busy x 2    Will continue to attempt to reach pt         138/85 -- -- -- -- 94 % -- --   03/31/20 1528 134/84 -- -- 107 27 99 % -- --   03/31/20 1524 134/84 -- -- -- -- 99 % -- --   03/31/20 1501 113/84 -- -- -- -- 97 % -- --   03/31/20 1500 113/84 103.3 °F (39.6 °C) Oral 113 24 96 % -- --   03/31/20 1445 121/86 -- -- -- -- 99 % -- --   03/31/20 1431 125/86 -- -- -- -- 97 % -- --   03/31/20 1425 133/86 -- -- 101 25 99 % -- --       Physical Exam  Constitutional:       Appearance: She is normal weight. HENT:      Head: Normocephalic. Nose: Nose normal. No congestion. Mouth/Throat:      Mouth: Mucous membranes are moist.   Eyes:      General: No scleral icterus. Conjunctiva/sclera: Conjunctivae normal.   Neck:      Musculoskeletal: No neck rigidity or muscular tenderness. Cardiovascular:      Rate and Rhythm: Normal rate and regular rhythm. Heart sounds: Normal heart sounds. No murmur. Pulmonary:      Effort: No respiratory distress. Breath sounds: No wheezing. Abdominal:      General: There is no distension. Tenderness: There is no abdominal tenderness. Genitourinary:     Comments: No terrazas  Musculoskeletal:         General: No swelling or deformity. Skin:     General: Skin is dry. Coloration: Skin is not jaundiced. Neurological:      General: No focal deficit present. Mental Status: She is alert and oriented to person, place, and time. Cranial Nerves: No cranial nerve deficit. Psychiatric:         Mood and Affect: Mood normal.         Thought Content:  Thought content normal.           Medical Decision Making:   I have independently reviewed/ordered the following labs:    CBC with Differential:   Recent Labs     03/31/20  1415   WBC 6.5   HGB 15.2*   HCT 46.3      LYMPHOPCT 23*   MONOPCT 1     BMP:  Recent Labs     03/31/20  1415   *   K 4.4   CL 90*   CO2 22   BUN 9   CREATININE 0.64     Hepatic Function Panel:   Recent Labs     03/31/20  1415   PROT 8.3   LABALBU 3.6   BILITOT 0.35   ALKPHOS 92 ALT 19   AST 35*     No results for input(s): RPR in the last 72 hours. No results for input(s): HIV in the last 72 hours. No results for input(s): BC in the last 72 hours. Lab Results   Component Value Date    CREATININE 0.64 03/31/2020    GLUCOSE 332 03/31/2020       Detailed results: Thank you for allowing us to participate in the care of this patient. Please call with questions. This note is created with the assistance of a speech recognition program.  While intending to generate adocument that actually reflects the content of the visit, the document can still have some errors including those of syntax and sound a like substitutions which may escape proof reading. It such instances, actual meaningcan be extrapolated by contextual diversion.     Huey Pineda MD  Office: [phone]  Perfect serve / office [phone]

## 2020-04-01 NOTE — PROGRESS NOTES
errors including those of syntax and sound a like substitutions which may escape proof reading. It such instances, actual meaningcan be extrapolated by contextual diversion.     Tamy Daniel MD  Office: [phone]  Perfect serve / office [phone]

## 2020-04-01 NOTE — CONSULTS
Admission medications    Medication Sig Start Date End Date Taking? Authorizing Provider   metFORMIN (GLUCOPHAGE) 1000 MG tablet Take 1,000 mg by mouth daily (with breakfast)   Yes Historical Provider, MD     IMMUNIZATIONS:    There is no immunization history on file for this patient.     REVIEW OF SYSTEMS:  General: negative for chills, fatigue or fever  ENT: negative for headaches, nasal congestion, sore throat or visual changes  Allergy and Immunology: negative for postnasal drip or seasonal allergies  Hematological and Lymphatic: negative for bleeding problems, swollen lymph nodes  Respiratory: positive for cough and tachypnea negative for shortness of breath or wheezing  Cardiovascular: negative for edema or palpitations  Gastrointestinal: negative for abdominal pain, change in bowel habits or nausea/vomiting  Genito-Urinary: negative for dysuria or urinary frequency/urgency  Musculoskeletal: negative for joint pain or joint swelling  Neurological: negative for numbness/tingling, seizures or weakness  Dermatological: negative for pruritus or rash    PHYSICAL EXAMINATION     VITAL SIGNS:   LAST-  /81   Pulse 83   Temp 97.6 °F (36.4 °C) (Oral)   Resp 16   Ht 5' 9\" (1.753 m)   Wt 170 lb (77.1 kg)   SpO2 100%   BMI 25.10 kg/m²   8-24 HR RANGE-  TEMP Temp  Av.8 °F (37.7 °C)  Min: 97.6 °F (36.4 °C)  Max: 102.4 °F (93.6 °C)   BP Systolic (84AJW), QGS:617 , Min:104 , HUM:274      Diastolic (21RBD), ZKZ:82, Min:66, Max:90     PULSE Pulse  Av.5  Min: 83  Max: 100   RR Resp  Av  Min: 16  Max: 18   O2 SAT SpO2  Av %  Min: 100 %  Max: 100 %   OXYGEN DELIVERY O2 Flow Rate (L/min)  Av L/min  Min: 4 L/min  Max: 4 L/min     SYSTEMIC EXAMINATION:    General appearance - well appearing, overweight, comfortable and in no acute distress   Mental status - alert, oriented to person, place, and time   Eyes - pupils equal and reactive, extraocular eye movements intact, sclera anicteric   Ears - consolidation in both lungs more in lower lungs and upper lungs peripherally. 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 She had past medical history of diabetes, no known history of COPD or asthma and she is a non-smoker. She is on 4 L nasal cannula she is mildly tachypneic but not in distress she is able to complete sentences and on exam she has mild by lateral scattered crackles present no expiratory wheezing and no rhonchi. Normal WBC count with lymphopenia, elevated ferritin, mild hyponatremia, mild lactic acidosis, viral respiratory panel PCR negative, mycoplasma negative. Suspected COVID 19 infection. Viral pneumonia/SARS-COV-2 pneumonia. Acute hypoxia secondary to above. Diabetes mellitus with hyperglycemia  Mild lactic acidosis secondary to hypovolemia. She is started on hydroxychloroquine 400 mg twice daily for 2 doses and then 200 mg twice daily for 5 doses. She is also on Zithromax and Rocephin. Recommend to monitor LFTs and QTC while she is on Zithromax and hydroxychloroquine. Continue monitor temperature. Continue to monitor closely for any decompensation, respiratory distress or worsening hypoxia. We will get chest x-ray as needed. Discussed with nursing staff, treatment plan discussed. Please note that this chart was generated using voice recognition Dragon dictation software. Although every effort was made to ensure the accuracy of this automated transcription, some errors in transcription may have occurred.       Julieta Senior MD  4/1/2020 4:51 PM

## 2020-04-02 LAB
ABSOLUTE EOS #: 0 K/UL (ref 0–0.4)
ABSOLUTE IMMATURE GRANULOCYTE: 0.06 K/UL (ref 0–0.3)
ABSOLUTE LYMPH #: 1.32 K/UL (ref 1–4.8)
ABSOLUTE MONO #: 0.3 K/UL (ref 0.1–0.8)
ALBUMIN SERPL-MCNC: 2.7 G/DL (ref 3.5–5.2)
ALBUMIN/GLOBULIN RATIO: 0.8 (ref 1–2.5)
ALP BLD-CCNC: 66 U/L (ref 35–104)
ALT SERPL-CCNC: 28 U/L (ref 5–33)
ANION GAP SERPL CALCULATED.3IONS-SCNC: 12 MMOL/L (ref 9–17)
AST SERPL-CCNC: 56 U/L
BASOPHILS # BLD: 0 % (ref 0–2)
BASOPHILS ABSOLUTE: 0 K/UL (ref 0–0.2)
BILIRUB SERPL-MCNC: 0.23 MG/DL (ref 0.3–1.2)
BILIRUBIN DIRECT: <0.08 MG/DL
BILIRUBIN, INDIRECT: ABNORMAL MG/DL (ref 0–1)
BUN BLDV-MCNC: 4 MG/DL (ref 8–23)
BUN/CREAT BLD: ABNORMAL (ref 9–20)
CALCIUM SERPL-MCNC: 7.7 MG/DL (ref 8.6–10.4)
CHLORIDE BLD-SCNC: 102 MMOL/L (ref 98–107)
CO2: 22 MMOL/L (ref 20–31)
CREAT SERPL-MCNC: 0.43 MG/DL (ref 0.5–0.9)
DIFFERENTIAL TYPE: ABNORMAL
EOSINOPHILS RELATIVE PERCENT: 0 % (ref 1–4)
GFR AFRICAN AMERICAN: >60 ML/MIN
GFR NON-AFRICAN AMERICAN: >60 ML/MIN
GFR SERPL CREATININE-BSD FRML MDRD: ABNORMAL ML/MIN/{1.73_M2}
GFR SERPL CREATININE-BSD FRML MDRD: ABNORMAL ML/MIN/{1.73_M2}
GLOBULIN: ABNORMAL G/DL (ref 1.5–3.8)
GLUCOSE BLD-MCNC: 215 MG/DL (ref 65–105)
GLUCOSE BLD-MCNC: 227 MG/DL (ref 65–105)
GLUCOSE BLD-MCNC: 247 MG/DL (ref 65–105)
GLUCOSE BLD-MCNC: 247 MG/DL (ref 70–99)
HCT VFR BLD CALC: 34.7 % (ref 36.3–47.1)
HEMOGLOBIN: 11.6 G/DL (ref 11.9–15.1)
IMMATURE GRANULOCYTES: 1 %
LYMPHOCYTES # BLD: 22 % (ref 24–44)
MAGNESIUM: 1.8 MG/DL (ref 1.6–2.6)
MCH RBC QN AUTO: 31.2 PG (ref 25.2–33.5)
MCHC RBC AUTO-ENTMCNC: 33.4 G/DL (ref 28.4–34.8)
MCV RBC AUTO: 93.3 FL (ref 82.6–102.9)
MONOCYTES # BLD: 5 % (ref 1–7)
MORPHOLOGY: NORMAL
NRBC AUTOMATED: 0 PER 100 WBC
PDW BLD-RTO: 11.1 % (ref 11.8–14.4)
PLATELET # BLD: 254 K/UL (ref 138–453)
PLATELET ESTIMATE: ABNORMAL
PMV BLD AUTO: 10.4 FL (ref 8.1–13.5)
POTASSIUM SERPL-SCNC: 3.5 MMOL/L (ref 3.7–5.3)
RBC # BLD: 3.72 M/UL (ref 3.95–5.11)
RBC # BLD: ABNORMAL 10*6/UL
SARS-COV-2, NAA: DETECTED
SEG NEUTROPHILS: 72 % (ref 36–66)
SEGMENTED NEUTROPHILS ABSOLUTE COUNT: 4.32 K/UL (ref 1.8–7.7)
SODIUM BLD-SCNC: 136 MMOL/L (ref 135–144)
TOTAL PROTEIN: 6.3 G/DL (ref 6.4–8.3)
WBC # BLD: 6 K/UL (ref 3.5–11.3)
WBC # BLD: ABNORMAL 10*3/UL

## 2020-04-02 PROCEDURE — 2580000003 HC RX 258: Performed by: INTERNAL MEDICINE

## 2020-04-02 PROCEDURE — 2580000003 HC RX 258: Performed by: PHYSICIAN ASSISTANT

## 2020-04-02 PROCEDURE — 85025 COMPLETE CBC W/AUTO DIFF WBC: CPT

## 2020-04-02 PROCEDURE — 6370000000 HC RX 637 (ALT 250 FOR IP): Performed by: INTERNAL MEDICINE

## 2020-04-02 PROCEDURE — 83735 ASSAY OF MAGNESIUM: CPT

## 2020-04-02 PROCEDURE — 80048 BASIC METABOLIC PNL TOTAL CA: CPT

## 2020-04-02 PROCEDURE — 6360000002 HC RX W HCPCS: Performed by: PHYSICIAN ASSISTANT

## 2020-04-02 PROCEDURE — 82947 ASSAY GLUCOSE BLOOD QUANT: CPT

## 2020-04-02 PROCEDURE — 99232 SBSQ HOSP IP/OBS MODERATE 35: CPT | Performed by: INTERNAL MEDICINE

## 2020-04-02 PROCEDURE — 6370000000 HC RX 637 (ALT 250 FOR IP): Performed by: PHYSICIAN ASSISTANT

## 2020-04-02 PROCEDURE — 99233 SBSQ HOSP IP/OBS HIGH 50: CPT | Performed by: INTERNAL MEDICINE

## 2020-04-02 PROCEDURE — 80076 HEPATIC FUNCTION PANEL: CPT

## 2020-04-02 PROCEDURE — 2060000000 HC ICU INTERMEDIATE R&B

## 2020-04-02 PROCEDURE — 6360000002 HC RX W HCPCS: Performed by: INTERNAL MEDICINE

## 2020-04-02 RX ORDER — POTASSIUM CHLORIDE 20 MEQ/1
40 TABLET, EXTENDED RELEASE ORAL ONCE
Status: COMPLETED | OUTPATIENT
Start: 2020-04-02 | End: 2020-04-02

## 2020-04-02 RX ORDER — ALBUTEROL SULFATE 90 UG/1
6 AEROSOL, METERED RESPIRATORY (INHALATION) EVERY 6 HOURS
Status: DISCONTINUED | OUTPATIENT
Start: 2020-04-02 | End: 2020-04-03

## 2020-04-02 RX ADMIN — POTASSIUM CHLORIDE 40 MEQ: 1500 TABLET, EXTENDED RELEASE ORAL at 18:41

## 2020-04-02 RX ADMIN — ACETAMINOPHEN 650 MG: 325 TABLET ORAL at 05:16

## 2020-04-02 RX ADMIN — ENOXAPARIN SODIUM 40 MG: 40 INJECTION SUBCUTANEOUS at 11:23

## 2020-04-02 RX ADMIN — Medication 1200 MG: at 11:24

## 2020-04-02 RX ADMIN — ACETAMINOPHEN 650 MG: 325 TABLET ORAL at 20:54

## 2020-04-02 RX ADMIN — HYDROXYCHLOROQUINE SULFATE 200 MG: 200 TABLET, FILM COATED ORAL at 20:54

## 2020-04-02 RX ADMIN — Medication 6 PUFF: at 14:30

## 2020-04-02 RX ADMIN — INSULIN LISPRO 2 UNITS: 100 INJECTION, SOLUTION INTRAVENOUS; SUBCUTANEOUS at 10:51

## 2020-04-02 RX ADMIN — CEFTRIAXONE SODIUM 2 G: 2 INJECTION, POWDER, FOR SOLUTION INTRAMUSCULAR; INTRAVENOUS at 11:25

## 2020-04-02 RX ADMIN — INSULIN LISPRO 1 UNITS: 100 INJECTION, SOLUTION INTRAVENOUS; SUBCUTANEOUS at 22:06

## 2020-04-02 RX ADMIN — INSULIN LISPRO 2 UNITS: 100 INJECTION, SOLUTION INTRAVENOUS; SUBCUTANEOUS at 18:37

## 2020-04-02 RX ADMIN — AZITHROMYCIN MONOHYDRATE 500 MG: 500 INJECTION, POWDER, LYOPHILIZED, FOR SOLUTION INTRAVENOUS at 20:58

## 2020-04-02 RX ADMIN — HYDROXYCHLOROQUINE SULFATE 200 MG: 200 TABLET, FILM COATED ORAL at 11:23

## 2020-04-02 RX ADMIN — SODIUM CHLORIDE: 9 INJECTION, SOLUTION INTRAVENOUS at 11:25

## 2020-04-02 RX ADMIN — Medication 6 PUFF: at 18:40

## 2020-04-02 RX ADMIN — Medication 1200 MG: at 20:54

## 2020-04-02 ASSESSMENT — ENCOUNTER SYMPTOMS
SHORTNESS OF BREATH: 1
BACK PAIN: 0
SORE THROAT: 0
ABDOMINAL PAIN: 0
COLOR CHANGE: 0
COUGH: 1
PHOTOPHOBIA: 0

## 2020-04-02 ASSESSMENT — PAIN SCALES - GENERAL: PAINLEVEL_OUTOF10: 3

## 2020-04-02 NOTE — PLAN OF CARE
Problem: Falls - Risk of:  Goal: Will remain free from falls  Description: Will remain free from falls  4/1/2020 2215 by Denver Otoole RN  Outcome: Ongoing  4/1/2020 1007 by Andres Rubio RN  Outcome: Ongoing  Goal: Absence of physical injury  Description: Absence of physical injury  4/1/2020 2215 by Denver Otoole RN  Outcome: Ongoing  4/1/2020 1007 by Andres Rubio RN  Outcome: Ongoing     Problem: Breathing Pattern - Ineffective:  Goal: Ability to achieve and maintain a regular respiratory rate will improve  Description: Ability to achieve and maintain a regular respiratory rate will improve  4/1/2020 2215 by Denver Otoole RN  Outcome: Ongoing  4/1/2020 1007 by Andres Rubio RN  Outcome: Ongoing     Problem: Anxiety:  Goal: Level of anxiety will decrease  Description: Level of anxiety will decrease  4/1/2020 2215 by Denver Otoole RN  Outcome: Ongoing  4/1/2020 1007 by Andres Rubio RN  Outcome: Ongoing

## 2020-04-02 NOTE — PROGRESS NOTES
(H) 03/31/2020     No results found for: Electa Marker  Lab Results   Component Value Date    FERRITIN 1,205 (H) 03/31/2020     No results found for: SPEP, UPEP  No results found for: PSA, CEA, , SW0414,   Microbiology:    Pathology:    Radiology Reports:  CT CHEST PULMONARY EMBOLISM W CONTRAST   Final Result   Suboptimal evaluation of the pulmonary arteries due to motion artifact. No   convincing evidence for embolism or right heart strain. Multifocal ill-defined ground-glass and interstitial opacities within the   lower lobes bilaterally and inferior segments of the upper lobes. No   septated cavitation, lymphadenopathy, pleural effusions. Findings are   nonspecific, however are concerning for atypical/viral pneumonitis. XR CHEST PORTABLE   Final Result   Nonspecific multifocal areas of bronchial thickening and ground-glass   opacities suspicious for atypical or possible viral pneumonia. No   consolidation or pleural effusion. Pulmonary Function test:    Polysomnogram:    Echocardiogram:   No results found for this or any previous visit. Cardiac Catheterization:   No results found for this or any previous visit. ASSESSMENT AND PLAN     Assessment:  Bilateral lower lobe pneumonia  COVID suspect  Lymphopenia  Lactic acidotic  Ferritin elevation  Diabetes mellitus-uncontrolled, A1c of 11  QTC of 432    Plan:    Continue nasal cannula oxygen, keep saturation 90 to 92%    Patient continues to be on azithromycin, Rocephin and Plaquenil, will recommend a regular follow-up of LFTs, QTC monitoring. Last QTC was 432, (4/1/20)  LFTs reviewed, patient has elevation of AST from 35-56. ALT normal.    monitor closely for any decompensation, respiratory distress    Continue DuoNeb inhalation treatment. Avoid nebulization and positive Non invasive ventilation to prevent aerosol production. Maintain good blood sugar control.     Glipizide started by primary, will recommend

## 2020-04-02 NOTE — PROGRESS NOTES
Nutrition Assessment    Type and Reason for Visit: Initial, Positive Nutrition Screen(Weight Loss)    Nutrition Recommendations:   - Continue current Carb Control diet. Encourage/monitor intakes as tolerated. - Will provide Glucerna oral supplements x 2 per day with meals. Nutrition Assessment: Admitted with c/o cough, fever, and SOB. Noted pt also reports having a decreased appetite. Wt loss reported but no wt history avaliable and unable to speak to pt d/t isolation. RN reports pt consuming some of her meals. Will provide oral supplements to boost intakes. Malnutrition Assessment:  · Malnutrition Status: At risk for malnutrition  · Context: Acute illness or injury  · Findings of the 6 clinical characteristics of malnutrition (Minimum of 2 out of 6 clinical characteristics is required to make the diagnosis of moderate or severe Protein Calorie Malnutrition based on AND/ASPEN Guidelines):  1. Energy Intake-Less than or equal to 75% of estimated energy requirement  2. Weight Loss-Unable to assess  3. Fat Loss-No significant subcutaneous fat loss  4. Muscle Loss-No significant muscle mass loss  5. Fluid Accumulation-No significant fluid accumulation    Nutrition Risk Level:  Moderate    Nutrient Needs:  · Estimated Daily Total Kcal: 1495-7652 kcal/day  · Estimated Daily Protein (g): 75-95 gm pro/day    Nutrition Diagnosis:   · Problem: Inadequate oral intake  · Etiology: related to (Current Condition)     Signs and symptoms:  as evidenced by Patient report of, Diet history of poor intake    Objective Information:  · Wound Type: None  · Current Nutrition Therapies:  · Oral Diet Orders: Carb Control 4 Carbs/Meal   · Oral Diet intake: 26-50%, 51-75%  · Anthropometric Measures:  · Ht: 5' 9\" (175.3 cm)   · Current Body Wt: 170 lb (77.1 kg)  · Admission Body Wt: 170 lb (77.1 kg)  · Ideal Body Wt: 145 lb (65.8 kg), % Ideal Body 117%  · BMI Classification: BMI 25.0 - 29.9 Overweight    Nutrition Interventions: Continue current diet, Start ONS(Provide Glucerna supplements x 2 per day)  Continued Inpatient Monitoring, Education Not Indicated    Nutrition Evaluation:   · Evaluation: Goals set   · Goals: Oral intakes to meet % of estimated nutrition needs.     · Monitoring: Meal Intake, Supplement Intake, Diet Tolerance, Skin Integrity, I&O, Weight, Pertinent Labs, Monitor Hemodynamic Status, Monitor Bowel Function    Electronically signed by Marlon Manrique RD, LD on 4/2/20 at 2:15 PM EDT    Contact Number: [phone]

## 2020-04-02 NOTE — FLOWSHEET NOTE
Writer paged Dr. Fco Jordan regarding patients calcium level of 7.7. Awaiting recommendation.    Maria De Jesus Torrez RN

## 2020-04-02 NOTE — PROGRESS NOTES
[facility]    Progress Note    4/2/2020    10:01 AM    Name:   Renetta Rader  MRN:     [id number]     Acct:      [de-identified]   Room:   2005/2005-01  IP Day:  2  Admit Date:  3/31/2020 12:54 PM    PCP:   Elaine Whitmore MD  Code Status:  Full Code    Subjective:     C/C:   Chief Complaint   Patient presents with    Cough    Fever    Shortness of Breath     Interval History Status: not changed. Patient seen and examined  Patient feels weak short of breath  Patient denies fever chest pain   I am seeing the patient for pneumonia    Brief History:     Marie Padron is a 60 y.o. Non-/non  female who presents with Cough; Fever; and Shortness of Breath   and is admitted to the hospital for the management of <principal problem not specified>.     This is a 60 yr old female who presents with 5 day history of increasing fatigue, dry cough, shortness of breath, myalgias, and decreased appetite. Patient only has history of DMII, denies any lung disease or cardiac history. Patient denies fevers at home, however, TMax in ER:103, she was also found to be hypoxic on arrival with oxygen saturation in low 80s-- she does not normally require oxygen at baseline.     Presenting labs reveal: CT chest with evidence of bilateral ground glass opacities/viral PNA, Lactic Acid: 3.6, CRP:113, mild elevation in transaminases, Ferritin: 1,205 (procalcitonin and LDH pending), Viral pathogen panel pending, expect COVID-19 testing per ID.     On my evaluation, patient remains on 4L oxyegn with saturation at 97%, HR controlled, and relaxed/non-labored respiratory effort. She does complain of chest heaviness in coorelation with coughing episodes. We did discuss concerns for COVID-19 in light of lab work and CT chest findings.   Possible progression to decompensation discussed given above mentioned concerns-- we also discussed potential need

## 2020-04-02 NOTE — PROGRESS NOTES
[facility] -   Infectious diseases evaluation  admission date 3/31/2020    reason for consultation:   COVID    Impression :   Current:  · Severe CO VID bilateral lower lobe pneumonia  · Start of illness 3/24/20  · Lymphopenia cough SOB  · Lactic acidosis  · Ferritin 1205 elevated  ·  elevated  · Tachycardia  ·     Other:  · Severity Risk factors DM2  Discussion / summary of stay / plan of care   · COVID estimated start of symptoms 3/23/20  · Severe -day 10 disease - likely [place] surge started   ·   Recommendations   · Keep azithromycin Plaquenil day 3  · Follow CRP ferritin - order again for today  · If worse, and if the inflammatory makerws are worse, consider anti IL6 ectemera 400 mg iv once  · Disc w pt and pharmacy    Infection Control Recommendations   · Universal Precautions  · Airborne isolation  · Droplet Isolation    Antimicrobial Stewardship Recommendations   · Simplification of therapy  · Targeted therapy    Coordination ofOutpatient Care:   · Estimated Length of IV antimicrobials:  · Patient will need Midline / picc Catheter Insertion:   · Patient will need SNF:  · Patient will need outpatient wound care:     History of Present Illness:   Initial history:  Marie Padron is a 60y.o.-year-old female comes in because of cough shortness of breath ongoing for about 7 days, with worsening 2 days ago as chills shaking. Fever 103. Myalgia.   Took Zithromax and inhaler but did not improve    Diabetic type II no GI symptoms    Admitted NC O2 4 L and feel better, less SOB at exertion,  100% SO2  Does not use Oxygen at home      Interval changes  4/2/2020   Fever on going  COVID pos - day 3 plaq/zithro    Weak- cough - SOB worse a little- still heavy chest- no sore throat- no .diarrhea   Sp white - dizzy when walks     NEWS Score: 0-4 Low risk group; 5-6: Medium risk group; 7 or above: High risk group  Parameters 3 2 1 0 1 2 3   Age    < 65   ? 72 Musculoskeletal: Positive for myalgias. Negative for back pain. Skin: Negative for color change. Allergic/Immunologic: Negative for immunocompromised state. Neurological: Negative for tremors and headaches. Hematological: Negative for adenopathy. Psychiatric/Behavioral: Negative for confusion. Physical Examination :     Patient Vitals for the past 24 hrs:   BP Temp Temp src Pulse Resp SpO2   04/02/20 0512 127/77 99.5 °F (37.5 °C) Oral 81 22 98 %   04/02/20 0115 116/67 99.7 °F (37.6 °C) Oral 92 22 100 %   04/01/20 2015 126/75 102.5 °F (39.2 °C) Oral 86 20 100 %   04/01/20 1752 118/69 98.6 °F (37 °C) Oral 104 19 100 %         Physical Exam  Constitutional:       General: She is not in acute distress. Appearance: Normal appearance. She is not ill-appearing. HENT:      Head: Normocephalic and atraumatic. Mouth/Throat:      Mouth: Mucous membranes are moist.   Eyes:      General: No scleral icterus. Conjunctiva/sclera: Conjunctivae normal.   Neck:      Musculoskeletal: No neck rigidity or muscular tenderness. Cardiovascular:      Rate and Rhythm: Regular rhythm. Heart sounds: Normal heart sounds. No murmur. Pulmonary:      Effort: No respiratory distress. Breath sounds: No stridor. Rales present. Abdominal:      General: There is no distension. Tenderness: There is no abdominal tenderness. Musculoskeletal:         General: No swelling or deformity. Skin:     Coloration: Skin is not jaundiced. Findings: No bruising, erythema or lesion. Neurological:      General: No focal deficit present. Mental Status: She is alert and oriented to person, place, and time. Psychiatric:         Mood and Affect: Mood normal.         Thought Content:  Thought content normal.           Medical Decision Making:   I have independently reviewed/ordered the following labs:    CBC with Differential:   Recent Labs     04/01/20  0915 04/02/20  0600   WBC 5.5 6.0   HGB 13.0 11.6* HCT 39.3 34.7*    254   LYMPHOPCT 26 22*   MONOPCT 2 5     BMP:  Recent Labs     04/01/20  0915 04/02/20  0600   * 136   K 4.0 3.5*   CL 95* 102   CO2 23 22   BUN 6* 4*   CREATININE 0.50 0.43*   MG  --  1.8     Hepatic Function Panel:   Recent Labs     03/31/20  1415 04/02/20  0600   PROT 8.3 6.3*   LABALBU 3.6 2.7*   BILIDIR  --  <0.08   IBILI  --  CANNOT BE CALCULATED   BILITOT 0.35 0.23*   ALKPHOS 92 66   ALT 19 28   AST 35* 56*     No results for input(s): RPR in the last 72 hours. No results for input(s): HIV in the last 72 hours. No results for input(s): BC in the last 72 hours. Lab Results   Component Value Date    CREATININE 0.43 04/02/2020    GLUCOSE 247 04/02/2020       Detailed results: Thank you for allowing us to participate in the care of this patient. Please call with questions. This note is created with the assistance of a speech recognition program.  While intending to generate adocument that actually reflects the content of the visit, the document can still have some errors including those of syntax and sound a like substitutions which may escape proof reading. It such instances, actual meaningcan be extrapolated by contextual diversion.     Artis Lisa MD  Office: [phone]  Perfect serve / office [phone]

## 2020-04-03 LAB
ABSOLUTE EOS #: 0 K/UL (ref 0–0.4)
ABSOLUTE IMMATURE GRANULOCYTE: 0.07 K/UL (ref 0–0.3)
ABSOLUTE LYMPH #: 0.83 K/UL (ref 1–4.8)
ABSOLUTE MONO #: 0.55 K/UL (ref 0.1–0.8)
ALBUMIN SERPL-MCNC: 2.9 G/DL (ref 3.5–5.2)
ALBUMIN/GLOBULIN RATIO: 0.7 (ref 1–2.5)
ALP BLD-CCNC: 81 U/L (ref 35–104)
ALT SERPL-CCNC: 46 U/L (ref 5–33)
ANION GAP SERPL CALCULATED.3IONS-SCNC: 14 MMOL/L (ref 9–17)
AST SERPL-CCNC: 83 U/L
ATYPICAL LYMPHOCYTE ABSOLUTE COUNT: 0.21 K/UL
ATYPICAL LYMPHOCYTES: 3 %
BASOPHILS # BLD: 0 % (ref 0–2)
BASOPHILS ABSOLUTE: 0 K/UL (ref 0–0.2)
BILIRUB SERPL-MCNC: 0.28 MG/DL (ref 0.3–1.2)
BILIRUBIN DIRECT: 0.11 MG/DL
BILIRUBIN, INDIRECT: 0.17 MG/DL (ref 0–1)
BUN BLDV-MCNC: 3 MG/DL (ref 8–23)
BUN/CREAT BLD: ABNORMAL (ref 9–20)
C-REACTIVE PROTEIN: 209.2 MG/L (ref 0–5)
CALCIUM SERPL-MCNC: 9 MG/DL (ref 8.6–10.4)
CHLORIDE BLD-SCNC: 98 MMOL/L (ref 98–107)
CO2: 24 MMOL/L (ref 20–31)
CREAT SERPL-MCNC: 0.49 MG/DL (ref 0.5–0.9)
DIFFERENTIAL TYPE: ABNORMAL
EOSINOPHILS RELATIVE PERCENT: 0 % (ref 1–4)
FERRITIN: 1578 UG/L (ref 13–150)
GFR AFRICAN AMERICAN: >60 ML/MIN
GFR NON-AFRICAN AMERICAN: >60 ML/MIN
GFR SERPL CREATININE-BSD FRML MDRD: ABNORMAL ML/MIN/{1.73_M2}
GFR SERPL CREATININE-BSD FRML MDRD: ABNORMAL ML/MIN/{1.73_M2}
GLOBULIN: ABNORMAL G/DL (ref 1.5–3.8)
GLUCOSE BLD-MCNC: 242 MG/DL (ref 70–99)
GLUCOSE BLD-MCNC: 249 MG/DL (ref 65–105)
HCT VFR BLD CALC: 38 % (ref 36.3–47.1)
HEMOGLOBIN: 12.8 G/DL (ref 11.9–15.1)
IMMATURE GRANULOCYTES: 1 %
INTERVENTION: NORMAL
LACTATE DEHYDROGENASE: 503 U/L (ref 135–214)
LYMPHOCYTES # BLD: 12 % (ref 24–44)
MAGNESIUM: 1.8 MG/DL (ref 1.6–2.6)
MCH RBC QN AUTO: 31.8 PG (ref 25.2–33.5)
MCHC RBC AUTO-ENTMCNC: 33.7 G/DL (ref 28.4–34.8)
MCV RBC AUTO: 94.3 FL (ref 82.6–102.9)
MONOCYTES # BLD: 8 % (ref 1–7)
MORPHOLOGY: NORMAL
NRBC AUTOMATED: 0 PER 100 WBC
PDW BLD-RTO: 11.1 % (ref 11.8–14.4)
PLATELET # BLD: 294 K/UL (ref 138–453)
PLATELET ESTIMATE: ABNORMAL
PMV BLD AUTO: 10.5 FL (ref 8.1–13.5)
POTASSIUM SERPL-SCNC: 3.9 MMOL/L (ref 3.7–5.3)
RBC # BLD: 4.03 M/UL (ref 3.95–5.11)
RBC # BLD: ABNORMAL 10*6/UL
SEG NEUTROPHILS: 76 % (ref 36–66)
SEGMENTED NEUTROPHILS ABSOLUTE COUNT: 5.24 K/UL (ref 1.8–7.7)
SODIUM BLD-SCNC: 136 MMOL/L (ref 135–144)
TOTAL PROTEIN: 7 G/DL (ref 6.4–8.3)
WBC # BLD: 6.9 K/UL (ref 3.5–11.3)
WBC # BLD: ABNORMAL 10*3/UL

## 2020-04-03 PROCEDURE — 99232 SBSQ HOSP IP/OBS MODERATE 35: CPT | Performed by: INTERNAL MEDICINE

## 2020-04-03 PROCEDURE — 83615 LACTATE (LD) (LDH) ENZYME: CPT

## 2020-04-03 PROCEDURE — 83735 ASSAY OF MAGNESIUM: CPT

## 2020-04-03 PROCEDURE — 85025 COMPLETE CBC W/AUTO DIFF WBC: CPT

## 2020-04-03 PROCEDURE — 2580000003 HC RX 258: Performed by: PHYSICIAN ASSISTANT

## 2020-04-03 PROCEDURE — 6370000000 HC RX 637 (ALT 250 FOR IP): Performed by: INTERNAL MEDICINE

## 2020-04-03 PROCEDURE — 6370000000 HC RX 637 (ALT 250 FOR IP): Performed by: PHYSICIAN ASSISTANT

## 2020-04-03 PROCEDURE — 99233 SBSQ HOSP IP/OBS HIGH 50: CPT | Performed by: INTERNAL MEDICINE

## 2020-04-03 PROCEDURE — 93005 ELECTROCARDIOGRAM TRACING: CPT | Performed by: STUDENT IN AN ORGANIZED HEALTH CARE EDUCATION/TRAINING PROGRAM

## 2020-04-03 PROCEDURE — 80048 BASIC METABOLIC PNL TOTAL CA: CPT

## 2020-04-03 PROCEDURE — 82947 ASSAY GLUCOSE BLOOD QUANT: CPT

## 2020-04-03 PROCEDURE — 86140 C-REACTIVE PROTEIN: CPT

## 2020-04-03 PROCEDURE — 80076 HEPATIC FUNCTION PANEL: CPT

## 2020-04-03 PROCEDURE — 2060000000 HC ICU INTERMEDIATE R&B

## 2020-04-03 PROCEDURE — 6360000002 HC RX W HCPCS: Performed by: PHYSICIAN ASSISTANT

## 2020-04-03 PROCEDURE — 82728 ASSAY OF FERRITIN: CPT

## 2020-04-03 RX ORDER — INSULIN GLARGINE 100 [IU]/ML
9 INJECTION, SOLUTION SUBCUTANEOUS DAILY
Status: DISCONTINUED | OUTPATIENT
Start: 2020-04-03 | End: 2020-04-05

## 2020-04-03 RX ORDER — ALBUTEROL SULFATE 90 UG/1
2 AEROSOL, METERED RESPIRATORY (INHALATION) EVERY 6 HOURS PRN
Status: DISCONTINUED | OUTPATIENT
Start: 2020-04-03 | End: 2020-04-09 | Stop reason: HOSPADM

## 2020-04-03 RX ORDER — ALBUTEROL SULFATE 90 UG/1
6 AEROSOL, METERED RESPIRATORY (INHALATION) EVERY 6 HOURS PRN
Status: DISCONTINUED | OUTPATIENT
Start: 2020-04-03 | End: 2020-04-03

## 2020-04-03 RX ADMIN — INSULIN LISPRO 2 UNITS: 100 INJECTION, SOLUTION INTRAVENOUS; SUBCUTANEOUS at 08:00

## 2020-04-03 RX ADMIN — INSULIN LISPRO 1 UNITS: 100 INJECTION, SOLUTION INTRAVENOUS; SUBCUTANEOUS at 20:49

## 2020-04-03 RX ADMIN — Medication 10 ML: at 17:33

## 2020-04-03 RX ADMIN — Medication 6 PUFF: at 09:13

## 2020-04-03 RX ADMIN — AZITHROMYCIN MONOHYDRATE 500 MG: 500 INJECTION, POWDER, LYOPHILIZED, FOR SOLUTION INTRAVENOUS at 17:32

## 2020-04-03 RX ADMIN — INSULIN LISPRO 2 UNITS: 100 INJECTION, SOLUTION INTRAVENOUS; SUBCUTANEOUS at 08:05

## 2020-04-03 RX ADMIN — ENOXAPARIN SODIUM 40 MG: 40 INJECTION SUBCUTANEOUS at 09:09

## 2020-04-03 RX ADMIN — SODIUM CHLORIDE: 9 INJECTION, SOLUTION INTRAVENOUS at 02:55

## 2020-04-03 RX ADMIN — HYDROXYCHLOROQUINE SULFATE 200 MG: 200 TABLET, FILM COATED ORAL at 09:09

## 2020-04-03 RX ADMIN — INSULIN GLARGINE 9 UNITS: 100 INJECTION, SOLUTION SUBCUTANEOUS at 10:35

## 2020-04-03 RX ADMIN — HYDROXYCHLOROQUINE SULFATE 200 MG: 200 TABLET, FILM COATED ORAL at 20:30

## 2020-04-03 RX ADMIN — Medication 1200 MG: at 20:30

## 2020-04-03 RX ADMIN — Medication 6 PUFF: at 02:39

## 2020-04-03 RX ADMIN — INSULIN LISPRO 1 UNITS: 100 INJECTION, SOLUTION INTRAVENOUS; SUBCUTANEOUS at 18:15

## 2020-04-03 RX ADMIN — SODIUM CHLORIDE, PRESERVATIVE FREE 10 ML: 5 INJECTION INTRAVENOUS at 22:07

## 2020-04-03 RX ADMIN — GLIPIZIDE 5 MG: 5 TABLET ORAL at 09:09

## 2020-04-03 RX ADMIN — Medication 1200 MG: at 09:09

## 2020-04-03 RX ADMIN — ACETAMINOPHEN 650 MG: 325 TABLET ORAL at 17:38

## 2020-04-03 RX ADMIN — ACETAMINOPHEN 650 MG: 325 TABLET ORAL at 02:40

## 2020-04-03 ASSESSMENT — ENCOUNTER SYMPTOMS
COLOR CHANGE: 0
ABDOMINAL PAIN: 0
SORE THROAT: 0
PHOTOPHOBIA: 0
SHORTNESS OF BREATH: 1
BACK PAIN: 0
COUGH: 1

## 2020-04-03 ASSESSMENT — PAIN SCALES - GENERAL
PAINLEVEL_OUTOF10: 4
PAINLEVEL_OUTOF10: 2
PAINLEVEL_OUTOF10: 2

## 2020-04-03 ASSESSMENT — PAIN DESCRIPTION - LOCATION: LOCATION: HEAD

## 2020-04-03 ASSESSMENT — PAIN DESCRIPTION - DESCRIPTORS: DESCRIPTORS: HEADACHE

## 2020-04-03 NOTE — PLAN OF CARE
Problem: RESPIRATORY  Intervention: Respiratory assessment  Note: ALE CARR, RCPPatient Assessment complete. Multifocal pneumonia [J18.9]  Multifocal pneumonia [J18.9] . Vitals:    04/03/20 0247   BP: 137/87   Pulse: 80   Resp: 18   Temp: 100.1 °F (37.8 °C)   SpO2: 97%   . Patients home meds are   Prior to Admission medications    Medication Sig Start Date End Date Taking? Authorizing Provider   metFORMIN (GLUCOPHAGE) 1000 MG tablet Take 1,000 mg by mouth daily (with breakfast)   Yes Historical Provider, MD   .  Assessment     Remains  SOB related to covid virus. No need for PRN treatments. Has cough. No pulmonary history will maintain as needed. Pulm note read.      RR 19  Breath Sounds: clear      Bronchodilator assessment at level  1  Hyperinflation assessment at level   Secretion Management assessment at level      [x]    Bronchodilator Assessment  BRONCHODILATOR ASSESSMENT SCORE  Score 0 1 2 3 4 5   Breath Sounds   []  Patient Baseline [x]  No Wheeze good aeration []  Faint, scattered wheezing, good aeration []  Expiratory Wheezing and or moderately diminished []  Insp/Exp wheeze and/or very diminished []  Insp/Exp and/ or marked distress   Respiratory Rate   []  Patient Baseline [x]  Less than 20 [x]  Less than 20 []  20-25 []  Greater than 25 []  Greater than 25   Peak flow % of Pred or PB [x]  NA   []  Greater than 90%  []  81-90% []  71-80% []  Less than or equal to 70%  or unable to perform []  Unable due to Respiratory Distress   Dyspnea re []  Patient Baseline []  No SOB []  No SOB [x]  SOB on exertion []  SOB min activity []  At rest/acute   e FEV% Predicted       [x]  NA []  Above 69%  []  Unable []  Above 60-69%  []  Unable []  Above 50-59%  []  Unable []  Above 35-49%  []  Unable []  Less than 35%  []  Unable                 []  Hyperinflation Assessment  Score 1 2 3   CXR and Breath Sounds   []  Clear []  No atelectasis  Basilar aeration []  Atelectasis or absent basilar breath sounds Incentive Spirometry Volume  (Per IBW)   []  Greater than or equal to 15ml/Kg []  less than 15ml/Kg []  less than 15ml/Kg   Surgery within last 2 weeks []  None or general   []  Abdominal or thoracic surgery  []  Abdominal or thoracic   Chronic Pulmonary Historyre []  No []  Yes []  Yes     []  Secretion Management Assessment  Score 1 2 3   Bilateral Breath Sounds   []  Occasional Rhonchi []  Scattered Rhonchi []  Course Rhonchi and/or poor aeration   Sputum    []  Small amount of thin secretions []  Moderate amount of viscous secretions []  Copius, Viscious Yellow/ Secretions   CXR as reported by physician []  clear  []  Unavailable []  Infiltrates and/or consolidation  []  Unavailable []  Mucus Plugging and or lobar consolidation  []  Unavailable   Cough []  Strong, productive cough []  Weak productive cough []  No cough or weak non-productive cough   ALE CARR  5:08 PM                            FEMALE                                  MALE                            FEV1 Predicted Normal Values                        FEV1 Predicted Normal Values          Age                                     Height in Feet and Inches       Age                                     Height in Feet and Inches       4' 11\" 5' 1\" 5' 3\" 5' 5\" 5' 7\" 5' 9\" 5' 11\" 6' 1\"  4' 11\" 5' 1\" 5' 3\" 5' 5\" 5' 7\" 5' 9\" 5' 11\" 6' 1\"   42 - 45 2.49 2.66 2.84 3.03 3.22 3.42 3.62 3.83 42 - 45 2.82 3.03 3.26 3.49 3.72 3.96 4.22 4.47   46 - 49 2.40 2.57 2.76 2.94 3.14 3.33 3.54 3.75 46 - 49 2.70 2.92 3.14 3.37 3.61 3.85 4.10 4.36   50 - 53 2.31 2.48 2.66 2.85 3.04 3.24 3.45 3.66 50 - 53 2.58 2.80 3.02 3.25 3.49 3.73 3.98 4.24   54 - 57 2.21 2.38 2.57 2.75 2.95 3.14 3.35 3.56 54 - 57 2.46 2.67 2.89 3.12 3.36 3.60 3.85 4.11   58 - 61 2.10 2.28 2.46 2.65 2.84 3.04 3.24 3.45 58 - 61 2.32 2.54 2.76 2.99 3.23 3.47 3.72 3.98   62 - 65 1.99 2.17 2.35 2.54 2.73 2.93 3.13 3.34 62 - 65 2.19 2.40 2.62 2.85 3.09 3.33 3.58 3.84   66 - 69 1.88 2.05 2.23 2.42 2.61

## 2020-04-03 NOTE — PROGRESS NOTES
[facility]    Progress Note    4/3/2020    10:15 AM    Name:   Areli Jarvis  MRN:     [id number]     Acct:      [de-identified]   Room:   2005/2005-01  IP Day:  3  Admit Date:  3/31/2020 12:54 PM    PCP:   Silvestre Prieto MD  Code Status:  Full Code    Subjective:     C/C:   Chief Complaint   Patient presents with    Cough    Fever    Shortness of Breath     Interval History Status: not changed. Patient seen and examined  Patient feels weak short of breath  Patient denies fever chest pain   I am seeing the patient for pneumonia    Brief History:     Marie Padron is a 60 y.o. Non-/non  female who presents with Cough; Fever; and Shortness of Breath   and is admitted to the hospital for the management of <principal problem not specified>.     This is a 60 yr old female who presents with 5 day history of increasing fatigue, dry cough, shortness of breath, myalgias, and decreased appetite. Patient only has history of DMII, denies any lung disease or cardiac history. Patient denies fevers at home, however, TMax in ER:103, she was also found to be hypoxic on arrival with oxygen saturation in low 80s-- she does not normally require oxygen at baseline.     Presenting labs reveal: CT chest with evidence of bilateral ground glass opacities/viral PNA, Lactic Acid: 3.6, CRP:113, mild elevation in transaminases, Ferritin: 1,205 (procalcitonin and LDH pending), Viral pathogen panel pending, expect COVID-19 testing per ID.     On my evaluation, patient remains on 4L oxyegn with saturation at 97%, HR controlled, and relaxed/non-labored respiratory effort. She does complain of chest heaviness in coorelation with coughing episodes. We did discuss concerns for COVID-19 in light of lab work and CT chest findings.   Possible progression to decompensation discussed given above mentioned concerns-- we also discussed potential need pleural effusion. Ct Chest Pulmonary Embolism W Contrast    Result Date: 3/31/2020  Suboptimal evaluation of the pulmonary arteries due to motion artifact. No convincing evidence for embolism or right heart strain. Multifocal ill-defined ground-glass and interstitial opacities within the lower lobes bilaterally and inferior segments of the upper lobes. No septated cavitation, lymphadenopathy, pleural effusions. Findings are nonspecific, however are concerning for atypical/viral pneumonitis. Physical Examination:        General appearance:  alert, cooperative and no distress  Mental Status:  oriented to person, place and time and normal affect  Lungs: Positive crackles  Heart:  regular rate and rhythm, no murmur  Abdomen:  soft, nontender, nondistended, normal bowel sounds, no masses, hepatomegaly, splenomegaly  Extremities:  no edema, redness, tenderness in the calves  Skin:  no gross lesions, rashes, induration    Assessment:        Hospital Problems           Last Modified POA    Multifocal pneumonia 3/31/2020 Yes    Hypoxia 3/31/2020 Yes    Type 2 diabetes mellitus without complication, without long-term current use of insulin ([facility] [address] 3/31/2020 Yes    Hyponatremia 3/31/2020 Yes    Pneumonia due to SARS-associated coronavirus 4/2/2020 Yes          Plan:         Active Problems:    Multifocal pneumonia associated with severe sepsis present on admission patient had hypoxia tachycardia tachypnea most likely related to viral pneumonia secondary to COVID 19 Plaquenil and azithromycin monitor LFT QTC    Acute hypoxemic respiratory failure secondary to pneumonia oxygen    Type 2 diabetes mellitus without complication, without long-term current use of insulin ([facility]) uncontrolled insulin sliding scale    Acute hyponatremia monitor CMP IV fluid      Severe sepsis present on admission patient has tachycardia hypoxia acute hypoxemic respiratory failure lactic acid was elevated pulmonology recommendation

## 2020-04-03 NOTE — PROGRESS NOTES
any decompensation, respiratory distress. Continue DuoNeb inhalation treatment. Avoid nebulization and positive Non invasive ventilation to prevent aerosol production. Maintain good blood sugar control.     Continue DVT prophylaxis      Leida Morris MD  INTERNAL MEDICINE RESIDENT, PGY-2  [place], [place]  4/3/2020,10:29 AM

## 2020-04-03 NOTE — PROGRESS NOTES
101-110 111-219   ? 220   HR ? 40  41-50 51-90  111-130 ? 131   Consciousness    Alert   Drowsiness, lethargy, or confusion   Temperature ? 35.0 C (95.0 F)  35.1-36.0 C 95.1-96.9 F 36.1-38.0 C 97.0-100.4 F 38.1-39.0 C 100.5-102.3 F ? 39.1 C ? 102.4 F        NEWS Score:5 on 4/2/20      Summary of relevant labs:  Labs:  Procalcitonin 0.1  WBC 6.5 - 5.5  Absolute lymphocyte count 1.5    Troponin high sensitive less than 6  ALT 19  Lactic acid 3.6        Ferritin 1205      Micro:  3/31 Respiratory PCR neg  3/31 Mycoplasma IgM  3/31 CO VID testing      Imaging:  Chest x-ray multifocal bronchial thickening and groundglass opacities suspicious for atypical or viral pneumonia no consolidation seen or free effusion    CT scan 3/31 multifocal ill defined groundglass and interstitial opacities within both lower lobes. No lymph nodes enlargement no pleural effusions-highly suspicious for atypical viral pneumonia        I have personally reviewed the past medical history, past surgical history, medications, social history, and family history, and I haveupdated the database accordingly. Past Medical History:     Past Medical History:   Diagnosis Date    Diabetes mellitus ([facility] [address]        Past Surgical  History:   History reviewed. No pertinent surgical history.     Medications:      insulin glargine  9 Units Subcutaneous Daily    calcium carbonate  1,200 mg Oral BID    glipiZIDE  5 mg Oral QAM AC    insulin lispro  0-6 Units Subcutaneous TID WC    insulin lispro  0-3 Units Subcutaneous Nightly    sodium chloride flush  10 mL Intravenous 2 times per day    enoxaparin  40 mg Subcutaneous Daily    azithromycin  500 mg Intravenous Q24H    hydroxychloroquine  200 mg Oral BID       Social History:     Social History     Socioeconomic History    Marital status: Life Partner     Spouse name: Not on file    Number of children: Not on file    Years of education: Not on file    Highest education level:

## 2020-04-04 LAB
ABSOLUTE EOS #: 0.05 K/UL (ref 0–0.4)
ABSOLUTE IMMATURE GRANULOCYTE: 0.16 K/UL (ref 0–0.3)
ABSOLUTE LYMPH #: 1.11 K/UL (ref 1–4.8)
ABSOLUTE MONO #: 0.37 K/UL (ref 0.1–0.8)
ATYPICAL LYMPHOCYTE ABSOLUTE COUNT: 0.05 K/UL
ATYPICAL LYMPHOCYTES: 1 %
BASOPHILS # BLD: 0 % (ref 0–2)
BASOPHILS ABSOLUTE: 0 K/UL (ref 0–0.2)
DIFFERENTIAL TYPE: ABNORMAL
EKG ATRIAL RATE: 83 BPM
EKG P AXIS: 53 DEGREES
EKG P-R INTERVAL: 148 MS
EKG Q-T INTERVAL: 370 MS
EKG QRS DURATION: 82 MS
EKG QTC CALCULATION (BAZETT): 434 MS
EKG R AXIS: 15 DEGREES
EKG T AXIS: 43 DEGREES
EKG VENTRICULAR RATE: 83 BPM
EOSINOPHILS RELATIVE PERCENT: 1 % (ref 1–4)
GLUCOSE BLD-MCNC: 129 MG/DL (ref 65–105)
GLUCOSE BLD-MCNC: 184 MG/DL (ref 65–105)
GLUCOSE BLD-MCNC: 223 MG/DL (ref 65–105)
GLUCOSE BLD-MCNC: 228 MG/DL (ref 65–105)
GLUCOSE BLD-MCNC: 232 MG/DL (ref 65–105)
GLUCOSE BLD-MCNC: 247 MG/DL (ref 65–105)
HCT VFR BLD CALC: 36.3 % (ref 36.3–47.1)
HEMOGLOBIN: 11.8 G/DL (ref 11.9–15.1)
IMMATURE GRANULOCYTES: 3 %
LYMPHOCYTES # BLD: 21 % (ref 24–44)
MAGNESIUM: 1.8 MG/DL (ref 1.6–2.6)
MCH RBC QN AUTO: 30.6 PG (ref 25.2–33.5)
MCHC RBC AUTO-ENTMCNC: 32.5 G/DL (ref 28.4–34.8)
MCV RBC AUTO: 94 FL (ref 82.6–102.9)
MONOCYTES # BLD: 7 % (ref 1–7)
MORPHOLOGY: NORMAL
NRBC AUTOMATED: 0 PER 100 WBC
PDW BLD-RTO: 11.1 % (ref 11.8–14.4)
PLATELET # BLD: 330 K/UL (ref 138–453)
PLATELET ESTIMATE: ABNORMAL
PMV BLD AUTO: 10.4 FL (ref 8.1–13.5)
RBC # BLD: 3.86 M/UL (ref 3.95–5.11)
RBC # BLD: ABNORMAL 10*6/UL
SEG NEUTROPHILS: 67 % (ref 36–66)
SEGMENTED NEUTROPHILS ABSOLUTE COUNT: 3.56 K/UL (ref 1.8–7.7)
WBC # BLD: 5.3 K/UL (ref 3.5–11.3)
WBC # BLD: ABNORMAL 10*3/UL

## 2020-04-04 PROCEDURE — 6360000002 HC RX W HCPCS: Performed by: PHYSICIAN ASSISTANT

## 2020-04-04 PROCEDURE — 93010 ELECTROCARDIOGRAM REPORT: CPT | Performed by: INTERNAL MEDICINE

## 2020-04-04 PROCEDURE — 2060000000 HC ICU INTERMEDIATE R&B

## 2020-04-04 PROCEDURE — 83735 ASSAY OF MAGNESIUM: CPT

## 2020-04-04 PROCEDURE — 2580000003 HC RX 258: Performed by: PHYSICIAN ASSISTANT

## 2020-04-04 PROCEDURE — 6370000000 HC RX 637 (ALT 250 FOR IP): Performed by: INTERNAL MEDICINE

## 2020-04-04 PROCEDURE — 6370000000 HC RX 637 (ALT 250 FOR IP): Performed by: PHYSICIAN ASSISTANT

## 2020-04-04 PROCEDURE — 99232 SBSQ HOSP IP/OBS MODERATE 35: CPT | Performed by: INTERNAL MEDICINE

## 2020-04-04 PROCEDURE — 85025 COMPLETE CBC W/AUTO DIFF WBC: CPT

## 2020-04-04 PROCEDURE — 99233 SBSQ HOSP IP/OBS HIGH 50: CPT | Performed by: INTERNAL MEDICINE

## 2020-04-04 RX ADMIN — INSULIN LISPRO 2 UNITS: 100 INJECTION, SOLUTION INTRAVENOUS; SUBCUTANEOUS at 12:02

## 2020-04-04 RX ADMIN — INSULIN LISPRO 2 UNITS: 100 INJECTION, SOLUTION INTRAVENOUS; SUBCUTANEOUS at 17:52

## 2020-04-04 RX ADMIN — ENOXAPARIN SODIUM 40 MG: 40 INJECTION SUBCUTANEOUS at 09:29

## 2020-04-04 RX ADMIN — ACETAMINOPHEN 650 MG: 325 TABLET ORAL at 17:52

## 2020-04-04 RX ADMIN — INSULIN LISPRO 2 UNITS: 100 INJECTION, SOLUTION INTRAVENOUS; SUBCUTANEOUS at 09:29

## 2020-04-04 RX ADMIN — Medication 1200 MG: at 22:58

## 2020-04-04 RX ADMIN — HYDROXYCHLOROQUINE SULFATE 200 MG: 200 TABLET, FILM COATED ORAL at 09:28

## 2020-04-04 RX ADMIN — INSULIN GLARGINE 9 UNITS: 100 INJECTION, SOLUTION SUBCUTANEOUS at 09:29

## 2020-04-04 RX ADMIN — HYDROXYCHLOROQUINE SULFATE 200 MG: 200 TABLET, FILM COATED ORAL at 22:58

## 2020-04-04 RX ADMIN — AZITHROMYCIN MONOHYDRATE 500 MG: 500 INJECTION, POWDER, LYOPHILIZED, FOR SOLUTION INTRAVENOUS at 17:51

## 2020-04-04 RX ADMIN — Medication 1200 MG: at 09:28

## 2020-04-04 RX ADMIN — ACETAMINOPHEN 650 MG: 325 TABLET ORAL at 12:01

## 2020-04-04 RX ADMIN — SODIUM CHLORIDE, PRESERVATIVE FREE 10 ML: 5 INJECTION INTRAVENOUS at 09:29

## 2020-04-04 RX ADMIN — GLIPIZIDE 5 MG: 5 TABLET ORAL at 09:28

## 2020-04-04 RX ADMIN — ACETAMINOPHEN 650 MG: 325 TABLET ORAL at 22:59

## 2020-04-04 RX ADMIN — ACETAMINOPHEN 650 MG: 325 TABLET ORAL at 00:25

## 2020-04-04 ASSESSMENT — ENCOUNTER SYMPTOMS
SHORTNESS OF BREATH: 1
BACK PAIN: 0
SORE THROAT: 0
COLOR CHANGE: 0
PHOTOPHOBIA: 0
COUGH: 1
ABDOMINAL PAIN: 0

## 2020-04-04 ASSESSMENT — PAIN DESCRIPTION - PAIN TYPE: TYPE: ACUTE PAIN

## 2020-04-04 ASSESSMENT — PAIN SCALES - GENERAL
PAINLEVEL_OUTOF10: 4
PAINLEVEL_OUTOF10: 9
PAINLEVEL_OUTOF10: 6
PAINLEVEL_OUTOF10: 4

## 2020-04-04 ASSESSMENT — PAIN DESCRIPTION - LOCATION: LOCATION: HEAD

## 2020-04-04 NOTE — PROGRESS NOTES
[facility]    Progress Note    4/4/2020    9:16 AM    Name:   Kemar Hardin  MRN:     [id number]     Acct:      [de-identified]   Room:   2005/2005-01  IP Day:  4  Admit Date:  3/31/2020 12:54 PM    PCP:   Mikie Marie MD  Code Status:  Full Code    Subjective:     C/C:   Chief Complaint   Patient presents with    Cough    Fever    Shortness of Breath     Interval History Status: not changed. Patient seen and examined  Patient feels weak short of breath  Patient denies fever chest pain   I am seeing the patient for pneumonia    Brief History:     Marie Padron is a 60 y.o. Non-/non  female who presents with Cough; Fever; and Shortness of Breath   and is admitted to the hospital for the management of <principal problem not specified>.     This is a 60 yr old female who presents with 5 day history of increasing fatigue, dry cough, shortness of breath, myalgias, and decreased appetite. Patient only has history of DMII, denies any lung disease or cardiac history. Patient denies fevers at home, however, TMax in ER:103, she was also found to be hypoxic on arrival with oxygen saturation in low 80s-- she does not normally require oxygen at baseline.     Presenting labs reveal: CT chest with evidence of bilateral ground glass opacities/viral PNA, Lactic Acid: 3.6, CRP:113, mild elevation in transaminases, Ferritin: 1,205 (procalcitonin and LDH pending), Viral pathogen panel pending, expect COVID-19 testing per ID.     On my evaluation, patient remains on 4L oxyegn with saturation at 97%, HR controlled, and relaxed/non-labored respiratory effort. She does complain of chest heaviness in coorelation with coughing episodes. We did discuss concerns for COVID-19 in light of lab work and CT chest findings.   Possible progression to decompensation discussed given above mentioned concerns-- we also discussed potential need

## 2020-04-04 NOTE — PROGRESS NOTES
Units Subcutaneous Daily    calcium carbonate  1,200 mg Oral BID    glipiZIDE  5 mg Oral QAM AC    insulin lispro  0-6 Units Subcutaneous TID WC    insulin lispro  0-3 Units Subcutaneous Nightly    sodium chloride flush  10 mL Intravenous 2 times per day    enoxaparin  40 mg Subcutaneous Daily    azithromycin  500 mg Intravenous Q24H    hydroxychloroquine  200 mg Oral BID     Continuous Infusions:   dextrose       INPUT/OUTPUT:  In: 1800 [P.O.:1350; I.V.:450]  Out: 2050 [Urine:2050]    LABS:-  ABGs:   No results found for: PH, PCO2, PO2, HCO3, O2SAT  No results for input(s): PHART, PO2ART, EDB1ONQ, AGJ5EDV, BEART, Q2AUDFWY in the last 72 hours. No results found for: POCPH, POCPCO2, POCPO2, POCHCO3, VYWC6EMJ  CBC:   Recent Labs     04/02/20  0600 04/03/20  0255 04/04/20  0559   WBC 6.0 6.9 5.3   HGB 11.6* 12.8 11.8*   HCT 34.7* 38.0 36.3   MCV 93.3 94.3 94.0    294 330   LYMPHOPCT 22* 12* 21*   RBC 3.72* 4.03 3.86*   MCH 31.2 31.8 30.6   MCHC 33.4 33.7 32.5   RDW 11.1* 11.1* 11.1*     BMP:   Recent Labs     04/02/20  0600 04/03/20  0254    136   K 3.5* 3.9    98   CO2 22 24   BUN 4* 3*   CREATININE 0.43* 0.49*   GLUCOSE 247* 242*     Liver Function Test:   Recent Labs     04/03/20  0254   PROT 7.0   LABALBU 2.9*   ALT 46*   AST 83*   ALKPHOS 81   BILITOT 0.28*     Amylase/Lipase:  No results for input(s): AMYLASE, LIPASE in the last 72 hours. Coagulation Profile:   No results for input(s): INR, PROTIME, APTT in the last 72 hours. Cardiac Enzymes:  No results for input(s): CKTOTAL, CKMB, CKMBINDEX, TROPONINI in the last 72 hours.   Lactic Acid:  No results found for: LACTA  BNP:   No results found for: BNP  D-Dimer:  No results found for: DDIMER  Others:   No results found for: TSH, R7YHHFA, A5OPYYY, THYROIDAB, FT3, T4FREE  Lab Results   Component Value Date    .2 (H) 04/03/2020     No results found for: Tamra Molina  Lab Results   Component Value Date    FERRITIN 1,578 (H)

## 2020-04-04 NOTE — PLAN OF CARE
Problem: Falls - Risk of:  Goal: Will remain free from falls  Description: Will remain free from falls  Outcome: Ongoing  Goal: Absence of physical injury  Description: Absence of physical injury  Outcome: Ongoing     Problem: Breathing Pattern - Ineffective:  Goal: Ability to achieve and maintain a regular respiratory rate will improve  Description: Ability to achieve and maintain a regular respiratory rate will improve  Outcome: Ongoing     Problem: Anxiety:  Goal: Level of anxiety will decrease  Description: Level of anxiety will decrease  Outcome: Ongoing     Problem: Nutrition  Goal: Optimal nutrition therapy  Outcome: Ongoing     Problem: Discharge Planning:  Goal: Discharged to appropriate level of care  Description: Discharged to appropriate level of care  Outcome: Ongoing     Problem: Serum Glucose Level - Abnormal:  Goal: Ability to maintain appropriate glucose levels will improve  Description: Ability to maintain appropriate glucose levels will improve  Outcome: Ongoing     Problem: Sensory Perception - Impaired:  Goal: Ability to maintain a stable neurologic state will improve  Description: Ability to maintain a stable neurologic state will improve  Outcome: Ongoing

## 2020-04-04 NOTE — PROGRESS NOTES
111-219   ? 220   HR ? 40  41-50 51-90  111-130 ? 131   Consciousness    Alert   Drowsiness, lethargy, or confusion   Temperature ? 35.0 C (95.0 F)  35.1-36.0 C 95.1-96.9 F 36.1-38.0 C 97.0-100.4 F 38.1-39.0 C 100.5-102.3 F ? 39.1 C ? 102.4 F        NEWS Score: 5 on 4/2/20      Summary of relevant labs: 4/4/2020    Labs:  Procalcitonin 0.1  WBC 6.5 - 5.5-->6.9  Hb 11.8    Absolute lymphocyte count 1.5    BUN 3  Creat: 0.49    Troponin high sensitive less than 6  ALT 19-->46  Lactic acid 3.6    -->503  -->209  Ferritin 1205      Micro:  3/31 Respiratory PCR neg  3/31 Mycoplasma IgM  3/31 CO VID testing      Imaging:  Chest x-ray multifocal bronchial thickening and groundglass opacities suspicious for atypical or viral pneumonia no consolidation seen or free effusion    CT scan 3/31 multifocal ill defined groundglass and interstitial opacities within both lower lobes. No lymph nodes enlargement no pleural effusions-highly suspicious for atypical viral pneumonia    Discussed with patient, RN. I have personally reviewed the past medical history, past surgical history, medications, social history, and family history, and I haveupdated the database accordingly. Past Medical History:     Past Medical History:   Diagnosis Date    Diabetes mellitus ([facility] [address]        Past Surgical  History:   History reviewed. No pertinent surgical history.     Medications:      insulin glargine  9 Units Subcutaneous Daily    calcium carbonate  1,200 mg Oral BID    glipiZIDE  5 mg Oral QAM AC    insulin lispro  0-6 Units Subcutaneous TID WC    insulin lispro  0-3 Units Subcutaneous Nightly    sodium chloride flush  10 mL Intravenous 2 times per day    enoxaparin  40 mg Subcutaneous Daily    azithromycin  500 mg Intravenous Q24H    hydroxychloroquine  200 mg Oral BID       Social History:     Social History     Socioeconomic History    Marital status: Life Partner     Spouse name: Not on file    Number of 04/04/20  0559   WBC 6.9 5.3   HGB 12.8 11.8*   HCT 38.0 36.3    330   LYMPHOPCT 12* 21*   MONOPCT 8* 7     BMP:  Recent Labs     04/02/20  0600 04/03/20  0254 04/04/20  0559    136  --    K 3.5* 3.9  --     98  --    CO2 22 24  --    BUN 4* 3*  --    CREATININE 0.43* 0.49*  --    MG 1.8 1.8 1.8     Hepatic Function Panel:   Recent Labs     04/02/20  0600 04/03/20  0254   PROT 6.3* 7.0   LABALBU 2.7* 2.9*   BILIDIR <0.08 0.11   IBILI CANNOT BE CALCULATED 0.17   BILITOT 0.23* 0.28*   ALKPHOS 66 81   ALT 28 46*   AST 56* 83*     No results for input(s): RPR in the last 72 hours. No results for input(s): HIV in the last 72 hours. No results for input(s): BC in the last 72 hours. Lab Results   Component Value Date    CREATININE 0.49 04/03/2020    GLUCOSE 242 04/03/2020       Detailed results: Thank you for allowing us to participate in the care of this patient. Please call with questions. This note is created with the assistance of a speech recognition program.  While intending to generate adocument that actually reflects the content of the visit, the document can still have some errors including those of syntax and sound a like substitutions which may escape proof reading. It such instances, actual meaningcan be extrapolated by contextual diversion.     Cyrus Naik MD  Office: [phone]  Perfect serve / office [phone]

## 2020-04-05 LAB
ABSOLUTE EOS #: <0.03 K/UL (ref 0–0.44)
ABSOLUTE IMMATURE GRANULOCYTE: 0.16 K/UL (ref 0–0.3)
ABSOLUTE LYMPH #: 1.49 K/UL (ref 1.1–3.7)
ABSOLUTE MONO #: 0.87 K/UL (ref 0.1–1.2)
BASOPHILS # BLD: 1 % (ref 0–2)
BASOPHILS ABSOLUTE: 0.04 K/UL (ref 0–0.2)
DIFFERENTIAL TYPE: ABNORMAL
EOSINOPHILS RELATIVE PERCENT: 0 % (ref 1–4)
GLUCOSE BLD-MCNC: 129 MG/DL (ref 65–105)
GLUCOSE BLD-MCNC: 168 MG/DL (ref 65–105)
GLUCOSE BLD-MCNC: 230 MG/DL (ref 65–105)
HCT VFR BLD CALC: 37.3 % (ref 36.3–47.1)
HEMOGLOBIN: 12.3 G/DL (ref 11.9–15.1)
IMMATURE GRANULOCYTES: 2 %
LYMPHOCYTES # BLD: 20 % (ref 24–43)
MAGNESIUM: 1.8 MG/DL (ref 1.6–2.6)
MCH RBC QN AUTO: 30.9 PG (ref 25.2–33.5)
MCHC RBC AUTO-ENTMCNC: 33 G/DL (ref 28.4–34.8)
MCV RBC AUTO: 93.7 FL (ref 82.6–102.9)
MONOCYTES # BLD: 12 % (ref 3–12)
NRBC AUTOMATED: 0 PER 100 WBC
PDW BLD-RTO: 11 % (ref 11.8–14.4)
PLATELET # BLD: 411 K/UL (ref 138–453)
PLATELET ESTIMATE: ABNORMAL
PMV BLD AUTO: 10.3 FL (ref 8.1–13.5)
RBC # BLD: 3.98 M/UL (ref 3.95–5.11)
RBC # BLD: ABNORMAL 10*6/UL
SEG NEUTROPHILS: 65 % (ref 36–65)
SEGMENTED NEUTROPHILS ABSOLUTE COUNT: 4.86 K/UL (ref 1.5–8.1)
WBC # BLD: 7.4 K/UL (ref 3.5–11.3)
WBC # BLD: ABNORMAL 10*3/UL

## 2020-04-05 PROCEDURE — 83735 ASSAY OF MAGNESIUM: CPT

## 2020-04-05 PROCEDURE — 2060000000 HC ICU INTERMEDIATE R&B

## 2020-04-05 PROCEDURE — 6370000000 HC RX 637 (ALT 250 FOR IP): Performed by: INTERNAL MEDICINE

## 2020-04-05 PROCEDURE — 6360000002 HC RX W HCPCS: Performed by: PHYSICIAN ASSISTANT

## 2020-04-05 PROCEDURE — 85025 COMPLETE CBC W/AUTO DIFF WBC: CPT

## 2020-04-05 PROCEDURE — 6370000000 HC RX 637 (ALT 250 FOR IP): Performed by: PHYSICIAN ASSISTANT

## 2020-04-05 PROCEDURE — 99232 SBSQ HOSP IP/OBS MODERATE 35: CPT | Performed by: INTERNAL MEDICINE

## 2020-04-05 PROCEDURE — 2580000003 HC RX 258: Performed by: PHYSICIAN ASSISTANT

## 2020-04-05 PROCEDURE — 82947 ASSAY GLUCOSE BLOOD QUANT: CPT

## 2020-04-05 PROCEDURE — 99233 SBSQ HOSP IP/OBS HIGH 50: CPT | Performed by: INTERNAL MEDICINE

## 2020-04-05 RX ORDER — INSULIN GLARGINE 100 [IU]/ML
12 INJECTION, SOLUTION SUBCUTANEOUS DAILY
Status: DISCONTINUED | OUTPATIENT
Start: 2020-04-06 | End: 2020-04-09 | Stop reason: HOSPADM

## 2020-04-05 RX ORDER — FUROSEMIDE 20 MG/1
20 TABLET ORAL 2 TIMES DAILY
Status: COMPLETED | OUTPATIENT
Start: 2020-04-05 | End: 2020-04-06

## 2020-04-05 RX ADMIN — SODIUM CHLORIDE, PRESERVATIVE FREE 10 ML: 5 INJECTION INTRAVENOUS at 09:13

## 2020-04-05 RX ADMIN — HYDROXYCHLOROQUINE SULFATE 200 MG: 200 TABLET, FILM COATED ORAL at 23:09

## 2020-04-05 RX ADMIN — ACETAMINOPHEN 650 MG: 325 TABLET ORAL at 23:10

## 2020-04-05 RX ADMIN — ACETAMINOPHEN 650 MG: 325 TABLET ORAL at 09:23

## 2020-04-05 RX ADMIN — INSULIN LISPRO 1 UNITS: 100 INJECTION, SOLUTION INTRAVENOUS; SUBCUTANEOUS at 23:13

## 2020-04-05 RX ADMIN — HYDROXYCHLOROQUINE SULFATE 200 MG: 200 TABLET, FILM COATED ORAL at 09:13

## 2020-04-05 RX ADMIN — INSULIN LISPRO 1 UNITS: 100 INJECTION, SOLUTION INTRAVENOUS; SUBCUTANEOUS at 18:00

## 2020-04-05 RX ADMIN — GLIPIZIDE 5 MG: 5 TABLET ORAL at 09:14

## 2020-04-05 RX ADMIN — INSULIN LISPRO 2 UNITS: 100 INJECTION, SOLUTION INTRAVENOUS; SUBCUTANEOUS at 09:28

## 2020-04-05 RX ADMIN — FUROSEMIDE 20 MG: 20 TABLET ORAL at 23:09

## 2020-04-05 RX ADMIN — Medication 1200 MG: at 09:13

## 2020-04-05 RX ADMIN — INSULIN GLARGINE 9 UNITS: 100 INJECTION, SOLUTION SUBCUTANEOUS at 09:28

## 2020-04-05 RX ADMIN — Medication 1200 MG: at 23:09

## 2020-04-05 RX ADMIN — ENOXAPARIN SODIUM 40 MG: 40 INJECTION SUBCUTANEOUS at 09:13

## 2020-04-05 RX ADMIN — SODIUM CHLORIDE, PRESERVATIVE FREE 10 ML: 5 INJECTION INTRAVENOUS at 23:15

## 2020-04-05 ASSESSMENT — PAIN DESCRIPTION - PAIN TYPE: TYPE: ACUTE PAIN

## 2020-04-05 ASSESSMENT — PAIN SCALES - GENERAL
PAINLEVEL_OUTOF10: 6
PAINLEVEL_OUTOF10: 9

## 2020-04-05 ASSESSMENT — PAIN DESCRIPTION - LOCATION: LOCATION: HEAD

## 2020-04-05 NOTE — PLAN OF CARE
Problem: Falls - Risk of:  Goal: Will remain free from falls  Description: Will remain free from falls  Outcome: Ongoing  Goal: Absence of physical injury  Description: Absence of physical injury  Outcome: Ongoing     Problem: Breathing Pattern - Ineffective:  Goal: Ability to achieve and maintain a regular respiratory rate will improve  Description: Ability to achieve and maintain a regular respiratory rate will improve  Outcome: Ongoing     Problem: Anxiety:  Goal: Level of anxiety will decrease  Description: Level of anxiety will decrease  Outcome: Ongoing     Problem: Nutrition  Goal: Optimal nutrition therapy  Outcome: Ongoing     Problem: Discharge Planning:  Goal: Discharged to appropriate level of care  Description: Discharged to appropriate level of care  Outcome: Ongoing     Problem: Serum Glucose Level - Abnormal:  Goal: Ability to maintain appropriate glucose levels will improve  Description: Ability to maintain appropriate glucose levels will improve  Outcome: Ongoing     Problem: Sensory Perception - Impaired:  Goal: Ability to maintain a stable neurologic state will improve  Description: Ability to maintain a stable neurologic state will improve  Outcome: Ongoing     Problem: Pain:  Goal: Pain level will decrease  Description: Pain level will decrease  Outcome: Ongoing  Goal: Control of acute pain  Description: Control of acute pain  Outcome: Ongoing  Goal: Control of chronic pain  Description: Control of chronic pain  Outcome: Ongoing  Goal: Patient's pain/discomfort is manageable  Description: Patient's pain/discomfort is manageable  Outcome: Ongoing     Problem: Infection:  Goal: Will remain free from infection  Description: Will remain free from infection  Outcome: Ongoing     Problem: Safety:  Goal: Free from accidental physical injury  Description: Free from accidental physical injury  Outcome: Ongoing  Goal: Free from intentional harm  Description: Free from intentional harm  Outcome: Ongoing     Problem: Daily Care:  Goal: Daily care needs are met  Description: Daily care needs are met  Outcome: Ongoing     Problem: Discharge Planning:  Goal: Patients continuum of care needs are met  Description: Patients continuum of care needs are met  Outcome: Ongoing

## 2020-04-05 NOTE — PROGRESS NOTES
[facility]    Progress Note    4/5/2020    9:30 AM    Name:   Angélica Nuñez  MRN:     [id number]     Acct:      [de-identified]   Room:   2005/2005-01  IP Day:  5  Admit Date:  3/31/2020 12:54 PM    PCP:   Sherley Payan MD  Code Status:  Full Code    Subjective:     C/C:   Chief Complaint   Patient presents with    Cough    Fever    Shortness of Breath     Interval History Status: not changed. Patient seen and examined  Patient feels weak short of breath  Patient denies fever chest pain   I am seeing the patient for pneumonia    Brief History:     Marie Padron is a 60 y.o. Non-/non  female who presents with Cough; Fever; and Shortness of Breath   and is admitted to the hospital for the management of <principal problem not specified>.     This is a 60 yr old female who presents with 5 day history of increasing fatigue, dry cough, shortness of breath, myalgias, and decreased appetite. Patient only has history of DMII, denies any lung disease or cardiac history. Patient denies fevers at home, however, TMax in ER:103, she was also found to be hypoxic on arrival with oxygen saturation in low 80s-- she does not normally require oxygen at baseline.     Presenting labs reveal: CT chest with evidence of bilateral ground glass opacities/viral PNA, Lactic Acid: 3.6, CRP:113, mild elevation in transaminases, Ferritin: 1,205 (procalcitonin and LDH pending), Viral pathogen panel pending, expect COVID-19 testing per ID.     On my evaluation, patient remains on 4L oxyegn with saturation at 97%, HR controlled, and relaxed/non-labored respiratory effort. She does complain of chest heaviness in coorelation with coughing episodes. We did discuss concerns for COVID-19 in light of lab work and CT chest findings.   Possible progression to decompensation discussed given above mentioned concerns-- we also discussed potential need for intubation in these cases give rapid rate of decompensation. Patient is agreeable to intubate should her condition worsen and respiratory status become compromised. Medications: Allergies:  No Known Allergies    Current Meds:   Scheduled Meds:    furosemide  20 mg Oral BID    insulin glargine  9 Units Subcutaneous Daily    calcium carbonate  1,200 mg Oral BID    glipiZIDE  5 mg Oral QAM AC    insulin lispro  0-6 Units Subcutaneous TID WC    insulin lispro  0-3 Units Subcutaneous Nightly    sodium chloride flush  10 mL Intravenous 2 times per day    enoxaparin  40 mg Subcutaneous Daily    hydroxychloroquine  200 mg Oral BID     Continuous Infusions:    dextrose       PRN Meds: albuterol sulfate HFA, glucose, dextrose, glucagon (rDNA), dextrose, magnesium sulfate, potassium chloride **OR** potassium alternative oral replacement **OR** potassium chloride, sodium chloride flush, acetaminophen **OR** acetaminophen, magnesium hydroxide, promethazine **OR** ondansetron, nicotine    Data:     Past Medical History:   has a past medical history of Diabetes mellitus ([facility] [address] Social History:   reports that she has never smoked. She has never used smokeless tobacco. She reports current alcohol use. She reports that she does not use drugs. Family History: History reviewed. No pertinent family history. Vitals:  /83   Pulse 96   Temp 99.6 °F (37.6 °C) (Oral)   Resp 22   Ht 5' 9\" (1.753 m)   Wt 171 lb 1.6 oz (77.6 kg)   SpO2 97%   BMI 25.27 kg/m²   Temp (24hrs), Av.8 °F (37.7 °C), Min:98.9 °F (37.2 °C), Max:101.1 °F (38.4 °C)    Recent Labs     20  1157 20  1749 20  2258 20  0912   POCGLU 247* 228* 129* 230*       I/O (24Hr):     Intake/Output Summary (Last 24 hours) at 2020 0930  Last data filed at 2020 2309  Gross per 24 hour   Intake --   Output 1200 ml   Net -1200 ml       Labs:  Hematology:  Recent Labs     20  0254 20  0255 20  0559

## 2020-04-05 NOTE — PROGRESS NOTES
PULMONARY & CRITICAL CARE MEDICINE  PROGRESS NOTE     Patient:  Kirstie Rojas  MRN: [id number]  Admit date: 3/31/2020  Primary Care Physician: Maria M Noble MD  Consulting Physician: Mendy Cr MD    HISTORY     CHIEF COMPLAINT/REASON FOR CONSULT: Shortness of breath, cough    HISTORY OF PRESENT ILLNESS:  The patient examined at bedside. Patient is on 4 L of nasal cannula oxygen, saturating 97%. Overnight she did spike a fever of 101. 1. After that she has been afebrile 99.6. Patient has been constantly complaining of feeling short of breath however her oxygen requirements have not gone up. 1 dose of Lasix 20 mg given this morning. Good Urine output. PAST MEDICAL HISTORY:        Diagnosis Date    Diabetes mellitus ([facility] [address]      PAST SURGICAL HISTORY:    History reviewed. No pertinent surgical history. FAMILY HISTORY:   History reviewed. No pertinent family history. SOCIAL HISTORY:   TOBACCO:   reports that she has never smoked. She has never used smokeless tobacco.  ETOH:  reports current alcohol use. DRUGS: reports no history of drug use. ALLERGIES:    No Known Allergies      HOME MEDICATIONS:  Prior to Admission medications    Medication Sig Start Date End Date Taking? Authorizing Provider   metFORMIN (GLUCOPHAGE) 1000 MG tablet Take 1,000 mg by mouth daily (with breakfast)   Yes Historical Provider, MD     IMMUNIZATIONS:    There is no immunization history on file for this patient.     REVIEW OF SYSTEMS:  General: negative for chills, fatigue or fever  ENT: negative for headaches, nasal congestion, sore throat or visual changes  Allergy and Immunology: negative for postnasal drip or seasonal allergies  Hematological and Lymphatic: negative for bleeding problems, swollen lymph nodes  Respiratory: positive for cough and tachypnea with some shortness of breath, no wheezing  Cardiovascular: negative for edema or palpitations  Gastrointestinal: negative for abdominal pain, change in bowel

## 2020-04-05 NOTE — PROGRESS NOTES
Spoke with patient over the phone and patient requested that her niece, Oswaldo Mo, as well as brother, Jay Bedoya be called to be updated. Writer called Ami at [phone] at [facility], however, no answer. LM on machine requesting a call back. Writer called brother, Jay Bedoya at [phone] at [phone] and spoke with him regarding the patient and plan of care. Brother was very thankful for the update and thanked us for our services to his sister. Shared number to the nurse station and brother wrote it down. Called patient and updated her on us updating family for her.

## 2020-04-06 ENCOUNTER — APPOINTMENT (OUTPATIENT)
Dept: GENERAL RADIOLOGY | Age: 60
DRG: 871 | End: 2020-04-06
Payer: COMMERCIAL

## 2020-04-06 LAB
ABSOLUTE EOS #: 0.05 K/UL (ref 0–0.44)
ABSOLUTE EOS #: 0.07 K/UL (ref 0–0.44)
ABSOLUTE IMMATURE GRANULOCYTE: 0.19 K/UL (ref 0–0.3)
ABSOLUTE IMMATURE GRANULOCYTE: 0.2 K/UL (ref 0–0.3)
ABSOLUTE LYMPH #: 1.12 K/UL (ref 1.1–3.7)
ABSOLUTE LYMPH #: 1.37 K/UL (ref 1.1–3.7)
ABSOLUTE MONO #: 0.58 K/UL (ref 0.1–1.2)
ABSOLUTE MONO #: 0.76 K/UL (ref 0.1–1.2)
ALBUMIN SERPL-MCNC: 2.6 G/DL (ref 3.5–5.2)
ALBUMIN SERPL-MCNC: 2.8 G/DL (ref 3.5–5.2)
ALBUMIN/GLOBULIN RATIO: 0.5 (ref 1–2.5)
ALBUMIN/GLOBULIN RATIO: 0.5 (ref 1–2.5)
ALP BLD-CCNC: 97 U/L (ref 35–104)
ALP BLD-CCNC: 99 U/L (ref 35–104)
ALT SERPL-CCNC: 52 U/L (ref 5–33)
ALT SERPL-CCNC: 52 U/L (ref 5–33)
ANION GAP SERPL CALCULATED.3IONS-SCNC: 13 MMOL/L (ref 9–17)
ANION GAP SERPL CALCULATED.3IONS-SCNC: 13 MMOL/L (ref 9–17)
ANION GAP SERPL CALCULATED.3IONS-SCNC: 14 MMOL/L (ref 9–17)
AST SERPL-CCNC: 47 U/L
AST SERPL-CCNC: 52 U/L
BASOPHILS # BLD: 1 % (ref 0–2)
BASOPHILS # BLD: 1 % (ref 0–2)
BASOPHILS ABSOLUTE: 0.04 K/UL (ref 0–0.2)
BASOPHILS ABSOLUTE: 0.05 K/UL (ref 0–0.2)
BILIRUB SERPL-MCNC: 0.4 MG/DL (ref 0.3–1.2)
BILIRUB SERPL-MCNC: 0.42 MG/DL (ref 0.3–1.2)
BUN BLDV-MCNC: 10 MG/DL (ref 8–23)
BUN BLDV-MCNC: 5 MG/DL (ref 8–23)
BUN BLDV-MCNC: 6 MG/DL (ref 8–23)
BUN/CREAT BLD: ABNORMAL (ref 9–20)
C-REACTIVE PROTEIN: 195.2 MG/L (ref 0–5)
CALCIUM SERPL-MCNC: 8.8 MG/DL (ref 8.6–10.4)
CALCIUM SERPL-MCNC: 9.4 MG/DL (ref 8.6–10.4)
CALCIUM SERPL-MCNC: 9.5 MG/DL (ref 8.6–10.4)
CHLORIDE BLD-SCNC: 92 MMOL/L (ref 98–107)
CHLORIDE BLD-SCNC: 95 MMOL/L (ref 98–107)
CHLORIDE BLD-SCNC: 96 MMOL/L (ref 98–107)
CO2: 23 MMOL/L (ref 20–31)
CO2: 25 MMOL/L (ref 20–31)
CO2: 27 MMOL/L (ref 20–31)
CREAT SERPL-MCNC: 0.47 MG/DL (ref 0.5–0.9)
CREAT SERPL-MCNC: 0.52 MG/DL (ref 0.5–0.9)
CREAT SERPL-MCNC: 0.62 MG/DL (ref 0.5–0.9)
DIFFERENTIAL TYPE: ABNORMAL
DIFFERENTIAL TYPE: ABNORMAL
EOSINOPHILS RELATIVE PERCENT: 1 % (ref 1–4)
EOSINOPHILS RELATIVE PERCENT: 1 % (ref 1–4)
GFR AFRICAN AMERICAN: >60 ML/MIN
GFR NON-AFRICAN AMERICAN: >60 ML/MIN
GFR SERPL CREATININE-BSD FRML MDRD: ABNORMAL ML/MIN/{1.73_M2}
GLUCOSE BLD-MCNC: 215 MG/DL (ref 65–105)
GLUCOSE BLD-MCNC: 215 MG/DL (ref 70–99)
GLUCOSE BLD-MCNC: 236 MG/DL (ref 65–105)
GLUCOSE BLD-MCNC: 246 MG/DL (ref 70–99)
GLUCOSE BLD-MCNC: 247 MG/DL (ref 65–105)
GLUCOSE BLD-MCNC: 281 MG/DL (ref 65–105)
GLUCOSE BLD-MCNC: 381 MG/DL (ref 70–99)
HCT VFR BLD CALC: 36.4 % (ref 36.3–47.1)
HCT VFR BLD CALC: 41.6 % (ref 36.3–47.1)
HEMOGLOBIN: 12.3 G/DL (ref 11.9–15.1)
HEMOGLOBIN: 13.1 G/DL (ref 11.9–15.1)
IMMATURE GRANULOCYTES: 3 %
IMMATURE GRANULOCYTES: 3 %
LACTATE DEHYDROGENASE: 378 U/L (ref 135–214)
LYMPHOCYTES # BLD: 16 % (ref 24–43)
LYMPHOCYTES # BLD: 22 % (ref 24–43)
MAGNESIUM: 1.8 MG/DL (ref 1.6–2.6)
MCH RBC QN AUTO: 30.5 PG (ref 25.2–33.5)
MCH RBC QN AUTO: 32 PG (ref 25.2–33.5)
MCHC RBC AUTO-ENTMCNC: 31.5 G/DL (ref 28.4–34.8)
MCHC RBC AUTO-ENTMCNC: 33.8 G/DL (ref 28.4–34.8)
MCV RBC AUTO: 94.8 FL (ref 82.6–102.9)
MCV RBC AUTO: 97 FL (ref 82.6–102.9)
MONOCYTES # BLD: 12 % (ref 3–12)
MONOCYTES # BLD: 8 % (ref 3–12)
NRBC AUTOMATED: 0 PER 100 WBC
NRBC AUTOMATED: 0 PER 100 WBC
PDW BLD-RTO: 11 % (ref 11.8–14.4)
PDW BLD-RTO: 11.2 % (ref 11.8–14.4)
PLATELET # BLD: 464 K/UL (ref 138–453)
PLATELET # BLD: 490 K/UL (ref 138–453)
PLATELET ESTIMATE: ABNORMAL
PLATELET ESTIMATE: ABNORMAL
PMV BLD AUTO: 10.3 FL (ref 8.1–13.5)
PMV BLD AUTO: 9.8 FL (ref 8.1–13.5)
POTASSIUM SERPL-SCNC: 4.1 MMOL/L (ref 3.7–5.3)
RBC # BLD: 3.84 M/UL (ref 3.95–5.11)
RBC # BLD: 4.29 M/UL (ref 3.95–5.11)
RBC # BLD: ABNORMAL 10*6/UL
RBC # BLD: ABNORMAL 10*6/UL
SEG NEUTROPHILS: 61 % (ref 36–65)
SEG NEUTROPHILS: 71 % (ref 36–65)
SEGMENTED NEUTROPHILS ABSOLUTE COUNT: 3.71 K/UL (ref 1.5–8.1)
SEGMENTED NEUTROPHILS ABSOLUTE COUNT: 5 K/UL (ref 1.5–8.1)
SODIUM BLD-SCNC: 128 MMOL/L (ref 135–144)
SODIUM BLD-SCNC: 134 MMOL/L (ref 135–144)
SODIUM BLD-SCNC: 136 MMOL/L (ref 135–144)
TOTAL PROTEIN: 7.5 G/DL (ref 6.4–8.3)
TOTAL PROTEIN: 7.9 G/DL (ref 6.4–8.3)
WBC # BLD: 6.1 K/UL (ref 3.5–11.3)
WBC # BLD: 7 K/UL (ref 3.5–11.3)
WBC # BLD: ABNORMAL 10*3/UL
WBC # BLD: ABNORMAL 10*3/UL

## 2020-04-06 PROCEDURE — 6370000000 HC RX 637 (ALT 250 FOR IP): Performed by: PHYSICIAN ASSISTANT

## 2020-04-06 PROCEDURE — 99233 SBSQ HOSP IP/OBS HIGH 50: CPT | Performed by: INTERNAL MEDICINE

## 2020-04-06 PROCEDURE — 85025 COMPLETE CBC W/AUTO DIFF WBC: CPT

## 2020-04-06 PROCEDURE — 80053 COMPREHEN METABOLIC PANEL: CPT

## 2020-04-06 PROCEDURE — 99232 SBSQ HOSP IP/OBS MODERATE 35: CPT | Performed by: INTERNAL MEDICINE

## 2020-04-06 PROCEDURE — 83735 ASSAY OF MAGNESIUM: CPT

## 2020-04-06 PROCEDURE — 83615 LACTATE (LD) (LDH) ENZYME: CPT

## 2020-04-06 PROCEDURE — 2060000000 HC ICU INTERMEDIATE R&B

## 2020-04-06 PROCEDURE — 82728 ASSAY OF FERRITIN: CPT

## 2020-04-06 PROCEDURE — 86140 C-REACTIVE PROTEIN: CPT

## 2020-04-06 PROCEDURE — 6370000000 HC RX 637 (ALT 250 FOR IP): Performed by: INTERNAL MEDICINE

## 2020-04-06 PROCEDURE — 71045 X-RAY EXAM CHEST 1 VIEW: CPT

## 2020-04-06 PROCEDURE — 80048 BASIC METABOLIC PNL TOTAL CA: CPT

## 2020-04-06 PROCEDURE — 2580000003 HC RX 258: Performed by: PHYSICIAN ASSISTANT

## 2020-04-06 PROCEDURE — 6360000002 HC RX W HCPCS: Performed by: PHYSICIAN ASSISTANT

## 2020-04-06 RX ORDER — HYDROCODONE BITARTRATE AND ACETAMINOPHEN 5; 325 MG/1; MG/1
1 TABLET ORAL EVERY 6 HOURS PRN
Status: DISCONTINUED | OUTPATIENT
Start: 2020-04-06 | End: 2020-04-09 | Stop reason: HOSPADM

## 2020-04-06 RX ORDER — ECHINACEA PURPUREA EXTRACT 125 MG
1 TABLET ORAL PRN
Status: DISCONTINUED | OUTPATIENT
Start: 2020-04-06 | End: 2020-04-09 | Stop reason: HOSPADM

## 2020-04-06 RX ORDER — AZITHROMYCIN 250 MG/1
250 TABLET, FILM COATED ORAL DAILY
Status: DISCONTINUED | OUTPATIENT
Start: 2020-04-07 | End: 2020-04-09 | Stop reason: HOSPADM

## 2020-04-06 RX ORDER — GUAIFENESIN 600 MG/1
600 TABLET, EXTENDED RELEASE ORAL 2 TIMES DAILY
Status: DISCONTINUED | OUTPATIENT
Start: 2020-04-06 | End: 2020-04-09 | Stop reason: HOSPADM

## 2020-04-06 RX ORDER — HYDROXYCHLOROQUINE SULFATE 200 MG/1
200 TABLET, FILM COATED ORAL 2 TIMES DAILY
Status: DISCONTINUED | OUTPATIENT
Start: 2020-04-07 | End: 2020-04-09 | Stop reason: HOSPADM

## 2020-04-06 RX ADMIN — Medication 1200 MG: at 20:41

## 2020-04-06 RX ADMIN — INSULIN LISPRO 2 UNITS: 100 INJECTION, SOLUTION INTRAVENOUS; SUBCUTANEOUS at 20:51

## 2020-04-06 RX ADMIN — INSULIN LISPRO 3 UNITS: 100 INJECTION, SOLUTION INTRAVENOUS; SUBCUTANEOUS at 18:36

## 2020-04-06 RX ADMIN — SODIUM CHLORIDE, PRESERVATIVE FREE 10 ML: 5 INJECTION INTRAVENOUS at 20:41

## 2020-04-06 RX ADMIN — ACETAMINOPHEN 650 MG: 325 TABLET ORAL at 06:07

## 2020-04-06 RX ADMIN — GUAIFENESIN 600 MG: 600 TABLET, EXTENDED RELEASE ORAL at 20:41

## 2020-04-06 RX ADMIN — SODIUM CHLORIDE, PRESERVATIVE FREE 10 ML: 5 INJECTION INTRAVENOUS at 10:46

## 2020-04-06 RX ADMIN — INSULIN LISPRO 2 UNITS: 100 INJECTION, SOLUTION INTRAVENOUS; SUBCUTANEOUS at 10:00

## 2020-04-06 RX ADMIN — HYDROCODONE BITARTRATE AND ACETAMINOPHEN 1 TABLET: 5; 325 TABLET ORAL at 18:50

## 2020-04-06 RX ADMIN — GLIPIZIDE 5 MG: 5 TABLET ORAL at 10:46

## 2020-04-06 RX ADMIN — INSULIN GLARGINE 12 UNITS: 100 INJECTION, SOLUTION SUBCUTANEOUS at 10:47

## 2020-04-06 RX ADMIN — FUROSEMIDE 20 MG: 20 TABLET ORAL at 10:46

## 2020-04-06 RX ADMIN — HYDROCODONE BITARTRATE AND ACETAMINOPHEN 1 TABLET: 5; 325 TABLET ORAL at 10:44

## 2020-04-06 RX ADMIN — GUAIFENESIN 600 MG: 600 TABLET, EXTENDED RELEASE ORAL at 13:29

## 2020-04-06 RX ADMIN — ENOXAPARIN SODIUM 40 MG: 40 INJECTION SUBCUTANEOUS at 10:47

## 2020-04-06 RX ADMIN — Medication 1200 MG: at 10:46

## 2020-04-06 RX ADMIN — FUROSEMIDE 20 MG: 20 TABLET ORAL at 17:10

## 2020-04-06 RX ADMIN — INSULIN LISPRO 2 UNITS: 100 INJECTION, SOLUTION INTRAVENOUS; SUBCUTANEOUS at 13:47

## 2020-04-06 ASSESSMENT — PAIN DESCRIPTION - FREQUENCY: FREQUENCY: INTERMITTENT

## 2020-04-06 ASSESSMENT — PAIN SCALES - GENERAL
PAINLEVEL_OUTOF10: 5
PAINLEVEL_OUTOF10: 6
PAINLEVEL_OUTOF10: 9
PAINLEVEL_OUTOF10: 6
PAINLEVEL_OUTOF10: 8

## 2020-04-06 ASSESSMENT — ENCOUNTER SYMPTOMS
ABDOMINAL PAIN: 0
BACK PAIN: 0
COLOR CHANGE: 0
COUGH: 1
PHOTOPHOBIA: 0
SORE THROAT: 0
SHORTNESS OF BREATH: 1

## 2020-04-06 ASSESSMENT — PAIN DESCRIPTION - PAIN TYPE: TYPE: ACUTE PAIN

## 2020-04-06 ASSESSMENT — PAIN DESCRIPTION - LOCATION: LOCATION: HEAD

## 2020-04-06 ASSESSMENT — PAIN DESCRIPTION - PROGRESSION: CLINICAL_PROGRESSION: GRADUALLY WORSENING

## 2020-04-06 ASSESSMENT — PAIN DESCRIPTION - ONSET: ONSET: ON-GOING

## 2020-04-06 NOTE — PROGRESS NOTES
EXAMINATION:    General appearance - well appearing, overweight, comfortable and in no acute distress   Mental status - alert, oriented to person, place, and time   Eyes - pupils equal and reactive, extraocular eye movements intact, sclera anicteric   Ears - not examined   Nose - not examined   Mouth - mucous membranes moist, pharynx normal without lesions   Neck - supple, no significant adenopathy, carotids upstroke normal bilaterally, no bruits   Lymphatics - no palpable lymphadenopathy, no hepatosplenomegaly   Chest -mild scattered crackles positive, no wheezing noted.  Heart -tachypneic, regular rhythm, normal S1, S2, no murmurs, rubs, clicks or gallops   Abdomen - soft, nontender, nondistended, no masses or organomegaly   Neurological - motor and sensory grossly normal bilaterally   Musculoskeletal - no joint tenderness, deformity or swelling   Extremities - peripheral pulses normal, no pedal edema, no clubbing or cyanosis   Skin - normal coloration and turgor, no rashes, no suspicious skin lesions noted    DATA REVIEW     Medications: Current Inpatient  Scheduled Meds:   guaiFENesin  600 mg Oral BID    furosemide  20 mg Oral BID    insulin glargine  12 Units Subcutaneous Daily    calcium carbonate  1,200 mg Oral BID    glipiZIDE  5 mg Oral QAM AC    insulin lispro  0-6 Units Subcutaneous TID WC    insulin lispro  0-3 Units Subcutaneous Nightly    sodium chloride flush  10 mL Intravenous 2 times per day    enoxaparin  40 mg Subcutaneous Daily     Continuous Infusions:   dextrose       INPUT/OUTPUT:  In: -   Out: 500 [Urine:500]    LABS:-  ABGs:   No results found for: PH, PCO2, PO2, HCO3, O2SAT  No results for input(s): PHART, PO2ART, VWB3RTQ, QKO3GLY, BEART, T0QVKFIV in the last 72 hours.   No results found for: POCPH, POCPCO2, POCPO2, POCHCO3, TQJK1IYY  CBC:   Recent Labs     04/04/20  0559 04/05/20  0611 04/06/20  0645   WBC 5.3 7.4 6.1   HGB 11.8* 12.3 12.3   HCT 36.3 37.3 36.4 ASSESSMENT AND PLAN     Assessment:  Bilateral lower lobe pneumonia due to COVID 19 infection  Fever  Diabetes mellitus-uncontrolled, A1c of 11    Plan:    Bilateral lower lobe pneumonia secondary to COVID-19 infection  Currently on nasal cannula at 5 L/min, keep saturation 90 to 92%  She has been continued on Plaquenil and azithromycin for 10 days as per ID recommendations  CRP and LDH slightly improved, AST/ALT stable  Last QTC was 434  Continue to monitor LFTs and QTC while being on antibiotics.   Monitor respiratory status closely  Glycemic control as per primary service  Continue DVT prophylaxis    Vicenta Stoner MD  Pulmonary and critical care medicine  4/6/2020

## 2020-04-06 NOTE — PROGRESS NOTES
[facility] -   Infectious diseases evaluation  admission date 3/31/2020    reason for consultation:   COVID    Impression :   Current:  · Severe CO VID bilateral lower lobe pneumonia  · Start of illness 3/24/20  · Lymphopenia cough SOB  · Lactic acidosis  · Ferritin 1205 elevated  ·  elevated  · Tachycardia  ·     Other:  · Severity Risk factors DM2  Discussion / summary of stay / plan of care   · COVID estimated start of symptoms 3/23/20  · Severe -day 10 disease - likely [place] surge started   ·   Recommendations   · Keep azithromycin Plaquenil 10 days total due to delayed response - till 4/11/20  · She is not better - still very SOB and desaturating  · Will need CXR and ferritin LDH CRP CBC today  · Will follow    Infection Control Recommendations   · Universal Precautions  · Airborne isolation  · Droplet Isolation    Antimicrobial Stewardship Recommendations   · Simplification of therapy  · Targeted therapy    Coordination ofOutpatient Care:   · Estimated Length of IV antimicrobials:  · Patient will need Midline / picc Catheter Insertion:   · Patient will need SNF:  · Patient will need outpatient wound care:     History of Present Illness:   Initial history:  Marie Padron is a 60y.o.-year-old female comes in because of cough shortness of breath ongoing for about 7 days, with worsening 2 days ago as chills shaking. Fever 103. Myalgia.   Took Zithromax and inhaler but did not improve    Diabetic type II no GI symptoms    Admitted NC O2 4 L and feel better, less SOB at exertion,  100% SO2  Does not use Oxygen at home      Interval changes  4/6/2020   Fever 102 again  Iv no tender  Cough better -  Very SOB but better  Walked to the bathroom off the O2, and SO2 down to 80   Will need a commode    COVID pos - last day  plaq/zithro    NEWS Score: 0-4 Low risk group; 5-6: Medium risk group; 7 or above: High risk group  Parameters 3 2 1 0 1 2 3   Age < 65   ? 65   RR ? 8  9-11 12-20  21-24 ? 25   O2 Sats ? 91 92-93 94-95 ? 96      Suppl O2  Yes  No      SBP ? 90  101-110 111-219   ? 220   HR ? 40  41-50 51-90  111-130 ? 131   Consciousness    Alert   Drowsiness, lethargy, or confusion   Temperature ? 35.0 C (95.0 F)  35.1-36.0 C 95.1-96.9 F 36.1-38.0 C 97.0-100.4 F 38.1-39.0 C 100.5-102.3 F ? 39.1 C ? 102.4 F        NEWS Score:5 on 4/2/20      Summary of relevant labs:  Labs:  Procalcitonin 0.1  WBC 6.5 - 5.5  Absolute lymphocyte count 1.5    Troponin high sensitive less than 6  ALT 19  Lactic acid 3.6     - 503   - 209  Ferritin 1205 - 1578      Micro:  3/31 Respiratory PCR neg  3/31 Mycoplasma IgM  3/31 CO VID testing      Imaging:  Chest x-ray multifocal bronchial thickening and groundglass opacities suspicious for atypical or viral pneumonia no consolidation seen or free effusion    CT scan 3/31 multifocal ill defined groundglass and interstitial opacities within both lower lobes. No lymph nodes enlargement no pleural effusions-highly suspicious for atypical viral pneumonia        I have personally reviewed the past medical history, past surgical history, medications, social history, and family history, and I haveupdated the database accordingly. Past Medical History:     Past Medical History:   Diagnosis Date    Diabetes mellitus ([facility] [address]        Past Surgical  History:   History reviewed. No pertinent surgical history.     Medications:      guaiFENesin  600 mg Oral BID    insulin glargine  12 Units Subcutaneous Daily    calcium carbonate  1,200 mg Oral BID    glipiZIDE  5 mg Oral QAM AC    insulin lispro  0-6 Units Subcutaneous TID WC    insulin lispro  0-3 Units Subcutaneous Nightly    sodium chloride flush  10 mL Intravenous 2 times per day    enoxaparin  40 mg Subcutaneous Daily       Social History:     Social History     Socioeconomic History    Marital status: Life Partner     Spouse name: Not on file    Number of Allergic/Immunologic: Negative for immunocompromised state. Neurological: Negative for tremors and headaches. Hematological: Negative for adenopathy. Psychiatric/Behavioral: Negative for confusion. Physical Examination :     Patient Vitals for the past 24 hrs:   BP Temp Temp src Pulse Resp SpO2   04/06/20 1334 109/72 97.9 °F (36.6 °C) Axillary 94 24 93 %   04/06/20 0618 -- 102.2 °F (39 °C) -- -- -- --   04/06/20 0600 -- 102.2 °F (39 °C) Oral -- -- --   04/05/20 2300 115/74 -- -- -- -- --   04/05/20 2000 115/74 100.6 °F (38.1 °C) Oral -- -- --         Physical Exam  Constitutional:       General: She is not in acute distress. Appearance: Normal appearance. She is not ill-appearing. HENT:      Head: Normocephalic and atraumatic. Mouth/Throat:      Mouth: Mucous membranes are moist.   Eyes:      General: No scleral icterus. Conjunctiva/sclera: Conjunctivae normal.   Neck:      Musculoskeletal: No neck rigidity or muscular tenderness. Cardiovascular:      Rate and Rhythm: Regular rhythm. Heart sounds: Normal heart sounds. No murmur. Pulmonary:      Effort: No respiratory distress. Breath sounds: No stridor. Rales present. Abdominal:      General: There is no distension. Tenderness: There is no abdominal tenderness. Musculoskeletal:         General: No swelling or deformity. Skin:     Coloration: Skin is not jaundiced. Findings: No bruising, erythema or lesion. Neurological:      General: No focal deficit present. Mental Status: She is alert and oriented to person, place, and time. Psychiatric:         Mood and Affect: Mood normal.         Thought Content:  Thought content normal.           Medical Decision Making:   I have independently reviewed/ordered the following labs:    CBC with Differential:   Recent Labs     04/05/20  0611 04/06/20  0645   WBC 7.4 6.1   HGB 12.3 12.3   HCT 37.3 36.4    464*   LYMPHOPCT 20* 22*   MONOPCT 12 12

## 2020-04-06 NOTE — PROGRESS NOTES
[facility]    Progress Note    4/6/2020    10:20 AM    Name:   Kaveh Villegas  MRN:     [id number]     Acct:      [de-identified]   Room:   2005/2005-01  IP Day:  6  Admit Date:  3/31/2020 12:54 PM    PCP:   Noemy Epperson MD  Code Status:  Full Code    Subjective:     C/C:   Chief Complaint   Patient presents with    Cough    Fever    Shortness of Breath     Interval History Status: not changed. Patient seen and examined  Patient feels weak short of breath better than yesterday  Patient denies fever chest pain   I am seeing the patient for pneumonia    Brief History:     Marie Padron is a 60 y.o. Non-/non  female who presents with Cough; Fever; and Shortness of Breath   and is admitted to the hospital for the management of <principal problem not specified>.     This is a 60 yr old female who presents with 5 day history of increasing fatigue, dry cough, shortness of breath, myalgias, and decreased appetite. Patient only has history of DMII, denies any lung disease or cardiac history. Patient denies fevers at home, however, TMax in ER:103, she was also found to be hypoxic on arrival with oxygen saturation in low 80s-- she does not normally require oxygen at baseline.     Presenting labs reveal: CT chest with evidence of bilateral ground glass opacities/viral PNA, Lactic Acid: 3.6, CRP:113, mild elevation in transaminases, Ferritin: 1,205 (procalcitonin and LDH pending), Viral pathogen panel pending, expect COVID-19 testing per ID.     On my evaluation, patient remains on 4L oxyegn with saturation at 97%, HR controlled, and relaxed/non-labored respiratory effort. She does complain of chest heaviness in coorelation with coughing episodes. We did discuss concerns for COVID-19 in light of lab work and CT chest findings.   Possible progression to decompensation discussed given above mentioned concerns-- we also discussed potential need for intubation in these cases give rapid rate of decompensation. Patient is agreeable to intubate should her condition worsen and respiratory status become compromised. Medications: Allergies:  No Known Allergies    Current Meds:   Scheduled Meds:    furosemide  20 mg Oral BID    insulin glargine  12 Units Subcutaneous Daily    calcium carbonate  1,200 mg Oral BID    glipiZIDE  5 mg Oral QAM AC    insulin lispro  0-6 Units Subcutaneous TID WC    insulin lispro  0-3 Units Subcutaneous Nightly    sodium chloride flush  10 mL Intravenous 2 times per day    enoxaparin  40 mg Subcutaneous Daily     Continuous Infusions:    dextrose       PRN Meds: albuterol sulfate HFA, glucose, dextrose, glucagon (rDNA), dextrose, magnesium sulfate, potassium chloride **OR** potassium alternative oral replacement **OR** potassium chloride, sodium chloride flush, acetaminophen **OR** acetaminophen, magnesium hydroxide, promethazine **OR** ondansetron, nicotine    Data:     Past Medical History:   has a past medical history of Diabetes mellitus ([facility] [address] Social History:   reports that she has never smoked. She has never used smokeless tobacco. She reports current alcohol use. She reports that she does not use drugs. Family History: History reviewed. No pertinent family history. Vitals:  /74   Pulse 96   Temp 102.2 °F (39 °C)   Resp 22   Ht 5' 9\" (1.753 m)   Wt 171 lb 1.6 oz (77.6 kg)   SpO2 97%   BMI 25.27 kg/m²   Temp (24hrs), Av.7 °F (38.7 °C), Min:100.6 °F (38.1 °C), Max:102.2 °F (39 °C)    Recent Labs     20  0912 20  1343 20  1758 20  2312   POCGLU 230* 129* 168* 215*       I/O (24Hr):     Intake/Output Summary (Last 24 hours) at 2020 1020  Last data filed at 2020 0620  Gross per 24 hour   Intake --   Output 500 ml   Net -500 ml       Labs:  Hematology:  Recent Labs     20  0559 20  0611 20  0645   WBC 5.3 7.4 6.1   RBC

## 2020-04-07 PROBLEM — J06.9 ACUTE RESPIRATORY DISEASE DUE TO COVID-19 VIRUS: Status: ACTIVE | Noted: 2020-04-07

## 2020-04-07 PROBLEM — D64.9 ANEMIA: Status: ACTIVE | Noted: 2020-04-07

## 2020-04-07 PROBLEM — U07.1 ACUTE RESPIRATORY DISEASE DUE TO COVID-19 VIRUS: Status: ACTIVE | Noted: 2020-04-07

## 2020-04-07 LAB
ABSOLUTE EOS #: 0.05 K/UL (ref 0–0.44)
ABSOLUTE IMMATURE GRANULOCYTE: 0.2 K/UL (ref 0–0.3)
ABSOLUTE LYMPH #: 1.27 K/UL (ref 1.1–3.7)
ABSOLUTE MONO #: 0.86 K/UL (ref 0.1–1.2)
BASOPHILS # BLD: 1 % (ref 0–2)
BASOPHILS ABSOLUTE: 0.03 K/UL (ref 0–0.2)
DIFFERENTIAL TYPE: ABNORMAL
EOSINOPHILS RELATIVE PERCENT: 1 % (ref 1–4)
FERRITIN: 1857 UG/L (ref 13–150)
GLUCOSE BLD-MCNC: 148 MG/DL (ref 65–105)
GLUCOSE BLD-MCNC: 225 MG/DL (ref 65–105)
GLUCOSE BLD-MCNC: 276 MG/DL (ref 65–105)
GLUCOSE BLD-MCNC: 305 MG/DL (ref 65–105)
HCT VFR BLD CALC: 34.5 % (ref 36.3–47.1)
HEMOGLOBIN: 11.5 G/DL (ref 11.9–15.1)
IMMATURE GRANULOCYTES: 3 %
LYMPHOCYTES # BLD: 19 % (ref 24–43)
MAGNESIUM: 1.7 MG/DL (ref 1.6–2.6)
MCH RBC QN AUTO: 31.7 PG (ref 25.2–33.5)
MCHC RBC AUTO-ENTMCNC: 33.3 G/DL (ref 28.4–34.8)
MCV RBC AUTO: 95 FL (ref 82.6–102.9)
MONOCYTES # BLD: 13 % (ref 3–12)
NRBC AUTOMATED: 0 PER 100 WBC
PDW BLD-RTO: 11.2 % (ref 11.8–14.4)
PLATELET # BLD: 527 K/UL (ref 138–453)
PLATELET ESTIMATE: ABNORMAL
PMV BLD AUTO: 9.4 FL (ref 8.1–13.5)
RBC # BLD: 3.63 M/UL (ref 3.95–5.11)
RBC # BLD: ABNORMAL 10*6/UL
SEG NEUTROPHILS: 63 % (ref 36–65)
SEGMENTED NEUTROPHILS ABSOLUTE COUNT: 4.15 K/UL (ref 1.5–8.1)
WBC # BLD: 6.6 K/UL (ref 3.5–11.3)
WBC # BLD: ABNORMAL 10*3/UL

## 2020-04-07 PROCEDURE — 6360000002 HC RX W HCPCS: Performed by: PHYSICIAN ASSISTANT

## 2020-04-07 PROCEDURE — 6370000000 HC RX 637 (ALT 250 FOR IP): Performed by: INTERNAL MEDICINE

## 2020-04-07 PROCEDURE — 2060000000 HC ICU INTERMEDIATE R&B

## 2020-04-07 PROCEDURE — 99232 SBSQ HOSP IP/OBS MODERATE 35: CPT | Performed by: INTERNAL MEDICINE

## 2020-04-07 PROCEDURE — 6370000000 HC RX 637 (ALT 250 FOR IP): Performed by: PHYSICIAN ASSISTANT

## 2020-04-07 PROCEDURE — 82947 ASSAY GLUCOSE BLOOD QUANT: CPT

## 2020-04-07 PROCEDURE — 83735 ASSAY OF MAGNESIUM: CPT

## 2020-04-07 PROCEDURE — 2580000003 HC RX 258: Performed by: PHYSICIAN ASSISTANT

## 2020-04-07 PROCEDURE — 85025 COMPLETE CBC W/AUTO DIFF WBC: CPT

## 2020-04-07 PROCEDURE — 99233 SBSQ HOSP IP/OBS HIGH 50: CPT | Performed by: INTERNAL MEDICINE

## 2020-04-07 RX ORDER — AZITHROMYCIN 250 MG/1
250 TABLET, FILM COATED ORAL DAILY
Qty: 3 TABLET | Refills: 0 | Status: SHIPPED | OUTPATIENT
Start: 2020-04-08 | End: 2020-04-21

## 2020-04-07 RX ORDER — ALOGLIPTIN 12.5 MG/1
12.5 TABLET, FILM COATED ORAL DAILY
Status: DISCONTINUED | OUTPATIENT
Start: 2020-04-07 | End: 2020-04-09 | Stop reason: HOSPADM

## 2020-04-07 RX ORDER — HYDROXYCHLOROQUINE SULFATE 200 MG/1
200 TABLET, FILM COATED ORAL 2 TIMES DAILY
Qty: 8 TABLET | Refills: 0 | Status: SHIPPED | OUTPATIENT
Start: 2020-04-07 | End: 2020-05-04

## 2020-04-07 RX ADMIN — INSULIN LISPRO 1 UNITS: 100 INJECTION, SOLUTION INTRAVENOUS; SUBCUTANEOUS at 12:35

## 2020-04-07 RX ADMIN — GUAIFENESIN 600 MG: 600 TABLET, EXTENDED RELEASE ORAL at 08:26

## 2020-04-07 RX ADMIN — ENOXAPARIN SODIUM 40 MG: 40 INJECTION SUBCUTANEOUS at 08:26

## 2020-04-07 RX ADMIN — GLIPIZIDE 5 MG: 5 TABLET ORAL at 08:26

## 2020-04-07 RX ADMIN — Medication 1200 MG: at 20:55

## 2020-04-07 RX ADMIN — GUAIFENESIN 600 MG: 600 TABLET, EXTENDED RELEASE ORAL at 20:55

## 2020-04-07 RX ADMIN — ALOGLIPTIN 12.5 MG: 12.5 TABLET, FILM COATED ORAL at 12:53

## 2020-04-07 RX ADMIN — SODIUM CHLORIDE, PRESERVATIVE FREE 10 ML: 5 INJECTION INTRAVENOUS at 08:26

## 2020-04-07 RX ADMIN — AZITHROMYCIN 250 MG: 250 TABLET, FILM COATED ORAL at 08:26

## 2020-04-07 RX ADMIN — INSULIN GLARGINE 12 UNITS: 100 INJECTION, SOLUTION SUBCUTANEOUS at 08:26

## 2020-04-07 RX ADMIN — INSULIN LISPRO 3 UNITS: 100 INJECTION, SOLUTION INTRAVENOUS; SUBCUTANEOUS at 08:26

## 2020-04-07 RX ADMIN — HYDROCODONE BITARTRATE AND ACETAMINOPHEN 1 TABLET: 5; 325 TABLET ORAL at 05:26

## 2020-04-07 RX ADMIN — HYDROCODONE BITARTRATE AND ACETAMINOPHEN 1 TABLET: 5; 325 TABLET ORAL at 12:53

## 2020-04-07 RX ADMIN — SODIUM CHLORIDE, PRESERVATIVE FREE 10 ML: 5 INJECTION INTRAVENOUS at 20:59

## 2020-04-07 RX ADMIN — INSULIN LISPRO 2 UNITS: 100 INJECTION, SOLUTION INTRAVENOUS; SUBCUTANEOUS at 21:31

## 2020-04-07 RX ADMIN — HYDROCODONE BITARTRATE AND ACETAMINOPHEN 1 TABLET: 5; 325 TABLET ORAL at 21:22

## 2020-04-07 RX ADMIN — INSULIN LISPRO 2 UNITS: 100 INJECTION, SOLUTION INTRAVENOUS; SUBCUTANEOUS at 17:40

## 2020-04-07 RX ADMIN — HYDROXYCHLOROQUINE SULFATE 200 MG: 200 TABLET ORAL at 08:26

## 2020-04-07 RX ADMIN — Medication 1200 MG: at 08:26

## 2020-04-07 RX ADMIN — HYDROXYCHLOROQUINE SULFATE 200 MG: 200 TABLET ORAL at 20:55

## 2020-04-07 ASSESSMENT — ENCOUNTER SYMPTOMS
BACK PAIN: 0
PHOTOPHOBIA: 0
ABDOMINAL PAIN: 0
SORE THROAT: 0
COLOR CHANGE: 0
COUGH: 1
SHORTNESS OF BREATH: 1

## 2020-04-07 ASSESSMENT — PAIN DESCRIPTION - LOCATION
LOCATION: HEAD
LOCATION: HEAD

## 2020-04-07 ASSESSMENT — PAIN SCALES - GENERAL
PAINLEVEL_OUTOF10: 7
PAINLEVEL_OUTOF10: 7
PAINLEVEL_OUTOF10: 6
PAINLEVEL_OUTOF10: 6
PAINLEVEL_OUTOF10: 7
PAINLEVEL_OUTOF10: 0

## 2020-04-07 ASSESSMENT — PAIN DESCRIPTION - PAIN TYPE
TYPE: ACUTE PAIN
TYPE: ACUTE PAIN

## 2020-04-07 ASSESSMENT — PAIN DESCRIPTION - DESCRIPTORS: DESCRIPTORS: ACHING

## 2020-04-07 NOTE — PROGRESS NOTES
[facility] -   Infectious diseases evaluation  admission date 3/31/2020    reason for consultation:   COVID    Impression :   Current:  · Severe CO VID bilateral lower lobe pneumonia  · Start of illness 3/24/20  · Lymphopenia cough SOB  · Lactic acidosis  · Ferritin 1205 elevated  ·  elevated  · Tachycardia  ·     Other:  · Severity Risk factors DM2  Discussion / summary of stay / plan of care   · COVID estimated start of symptoms 3/23/20  · Severe -day 10 disease - likely [place] surge started   ·   Recommendations   · Keep azithromycin Plaquenil 10 days total due to delayed response - till 4/11/20  · STILL DOES NOT FEEL ready to go home, but is better  · Anticipate DC soon w po ASA status cannot be found on the log. and home O2 - might benefit from some iv fluids 1 L daily x few days at DC to help prevent dehydration  · Will follow    Infection Control Recommendations   · Universal Precautions  · Airborne isolation  · Droplet Isolation    Antimicrobial Stewardship Recommendations   · Simplification of therapy  · Targeted therapy    Coordination ofOutpatient Care:   · Estimated Length of IV antimicrobials:  · Patient will need Midline / picc Catheter Insertion:   · Patient will need SNF:  · Patient will need outpatient wound care:     History of Present Illness:   Initial history:  Marie Padron is a 60y.o.-year-old female comes in because of cough shortness of breath ongoing for about 7 days, with worsening 2 days ago as chills shaking. Fever 103. Myalgia.   Took Zithromax and inhaler but did not improve    Diabetic type II no GI symptoms    Admitted NC O2 4 L and feel better, less SOB at exertion,  100% SO2  Does not use Oxygen at home      Interval changes  4/7/2020   102 yesterday - no fever today  BP elevated  Better in general, no aches, less SOB  Needs O2 to move  Better in general  Ferritin 1857      All better  But CXR insulin lispro  0-6 Units Subcutaneous TID WC    insulin lispro  0-3 Units Subcutaneous Nightly    sodium chloride flush  10 mL Intravenous 2 times per day    enoxaparin  40 mg Subcutaneous Daily       Social History:     Social History     Socioeconomic History    Marital status: Life Partner     Spouse name: Not on file    Number of children: Not on file    Years of education: Not on file    Highest education level: Not on file   Occupational History    Not on file   Social Needs    Financial resource strain: Not on file    Food insecurity     Worry: Not on file     Inability: Not on file    Transportation needs     Medical: Not on file     Non-medical: Not on file   Tobacco Use    Smoking status: Never Smoker    Smokeless tobacco: Never Used   Substance and Sexual Activity    Alcohol use: Yes     Comment: occasionally    Drug use: Never    Sexual activity: Not on file   Lifestyle    Physical activity     Days per week: Not on file     Minutes per session: Not on file    Stress: Not on file   Relationships    Social connections     Talks on phone: Not on file     Gets together: Not on file     Attends Faith service: Not on file     Active member of club or organization: Not on file     Attends meetings of clubs or organizations: Not on file     Relationship status: Not on file    Intimate partner violence     Fear of current or ex partner: Not on file     Emotionally abused: Not on file     Physically abused: Not on file     Forced sexual activity: Not on file   Other Topics Concern    Not on file   Social History Narrative    Not on file       Family History:   History reviewed. No pertinent family history. Allergies:   Patient has no known allergies. Review of Systems:     Review of Systems   Constitutional: Negative for appetite change, chills, diaphoresis and fever. HENT: Negative for congestion and sore throat. Eyes: Negative for photophobia.    Respiratory: Positive for cough and shortness of breath. Cardiovascular: Negative for chest pain (from cough). Gastrointestinal: Negative for abdominal pain. Endocrine: Negative for polyphagia. Genitourinary: Negative for urgency. Musculoskeletal: Negative for back pain and myalgias. Skin: Negative for color change. Allergic/Immunologic: Negative for immunocompromised state. Neurological: Negative for tremors and headaches. Hematological: Negative for adenopathy. Psychiatric/Behavioral: Negative for confusion. Physical Examination :     Patient Vitals for the past 24 hrs:   BP Temp Temp src Pulse Resp SpO2 Weight   04/07/20 1245 135/81 99.3 °F (37.4 °C) Oral 85 18 97 % --   04/07/20 0815 114/71 99.1 °F (37.3 °C) Oral -- -- -- --   04/07/20 0518 110/70 -- -- 96 22 96 % 173 lb 1.6 oz (78.5 kg)   04/06/20 2020 107/70 99.2 °F (37.3 °C) Oral 106 21 96 % --   04/06/20 2000 -- -- -- 102 -- -- --         Physical Exam  Constitutional:       General: She is not in acute distress. Appearance: Normal appearance. She is not ill-appearing. HENT:      Head: Normocephalic and atraumatic. Mouth/Throat:      Mouth: Mucous membranes are moist.   Eyes:      General: No scleral icterus. Conjunctiva/sclera: Conjunctivae normal.   Neck:      Musculoskeletal: No neck rigidity or muscular tenderness. Cardiovascular:      Rate and Rhythm: Regular rhythm. Heart sounds: Normal heart sounds. No murmur. Pulmonary:      Effort: No respiratory distress. Breath sounds: No stridor. Rales present. Abdominal:      General: There is no distension. Tenderness: There is no abdominal tenderness. Musculoskeletal:         General: No swelling or deformity. Skin:     Coloration: Skin is not jaundiced. Findings: No bruising, erythema or lesion. Neurological:      General: No focal deficit present. Mental Status: She is alert and oriented to person, place, and time.    Psychiatric:         Mood and Affect:

## 2020-04-07 NOTE — PROGRESS NOTES
saturation 90 to 92%  She has been continued on Plaquenil and azithromycin for 10 days as per ID recommendations  CRP and LDH slightly improved, AST/ALT stable  Last QTC was 434  Continue to monitor LFTs and QTC while being on antibiotics.   Monitor respiratory status closely  Glycemic control as per primary service  Continue DVT prophylaxis    Juan Alberto Tapia MD  Pulmonary and critical care medicine  4/7/2020

## 2020-04-07 NOTE — ADT AUTH CERT
Patient Demographics     Name Patient ID SSN Gender Identity Birth Date   Andrea Ledesma [id number]  Female [birth year] (59 yrs)   Address Phone Email Employer    [address] [place] [address] [phone] (W)  [phone] (M)  NOT [id number] Race Occupation Emp Status    ROBERTA Black - Not Employed    Reg Status PCP Date Last Verified Next Review Date    Verified Karthik Almeida MD  [phone] 03/31/20 04/30/20    Admission Date Discharge Date Admitting Provider     03/31/20  Leighton Saldaña MD     Marital Status Bahai      Life Partner MCGINNIS [facility]      Emergency Contact 1 Emergency Contact 2 Emergency Contact 3 Emergency Contact [address] (P)  [phone] (H) Angela Benson (E)  [phone] Alveria Lie) [facility] [phone] Drew Doom) Nkechi Fraga (5) [phone] (H)   Utilization Reviews         Pneumonia - Care Day 6 (4/5/2020) by Maylin Davis RN         Review Status Review Entered   Completed 4/6/2020 13:03       Criteria Review      Care Day: 6 Care Date: 4/5/2020 Level of Care:    Guideline Day 2    Clinical Status    ( ) * No CO2 retention or acidosis    (X) * No requirement for mechanical ventilation    (X) * Hypotension absent    ( ) * Fever absent or reduced    ( ) * No hypoxia on room air or oxygenation improved    ( ) * Mental status improved or at baseline    Activity    ( ) * Increased activity    Routes    (X) Oral hydration, medications    (X) Usual diet    Interventions    (X) Pulse oximetry    (X) Possible oxygen    Medications    (X) IV or oral antibiotics    * Milestone   Additional Notes   4/5/2020   Covid positive   ·   furosemide    20 mg   Oral   BID   ·   insulin glargine    9 Units   Subcutaneous   Daily   ·   calcium carbonate    1,200 mg   Oral   BID   ·   glipiZIDE    5 mg   Oral   QAM AC   ·   insulin lispro    0-6 Units   Subcutaneous   TID WC   ·   insulin lispro    0-3 Units   Subcutaneous   Nightly   ·   sodium chloride flush hyponatremia monitor CMP IV fluid           Severe sepsis present on admission patient has tachycardia hypoxia acute hypoxemic respiratory failure lactic acid was elevated pulmonology recommendation appreciated      Pulmonology       Assessment:   Bilateral lower lobe pneumonia   COVID positive   Lymphopenia   Lactic acidotic   Ferritin elevation   Diabetes mellitus-uncontrolled, A1c of 11   QTC of 432       Plan:       Bilateral lower lobe pneumonia secondary to COVID-19 infection   Continue nasal cannula oxygen, keep saturation 90 to 92%   Continue on 4 lt, wean as tolerated       Patient continues to be on azithromycin, Plaquenil- day 4 today. Infectious disease following. Rocephin discontinued   will recommend a regular follow-up of LFTs, QTC monitoring. Last QTC was 432, (4/1/20)   LFTs reviewed, patient has uptrending AST, ALT. Continues to be on antibiotics. Will appreciate ID recommendations.        monitor closely for any decompensation, respiratory distress.       Continue DuoNeb inhalation treatment. Avoid nebulization and positive Non invasive ventilation to prevent aerosol production. Maintain good blood sugar control.       Continue DVT prophylaxis          Infectious Disease       Recommendations   ? Keep azithromycin Plaquenil day 4   ? Follow CRP ldh ferritin -add on today   ? If worse, and if the inflammatory makerws are worse, consider anti IL6 ectemera 400 mg iv once   ? Disc w pt and pharmacy   ?        Covid 19 Detected by Urban Yip RN         Review Status Review Entered   In Primary 4/3/2020 11:27       Criteria Review   The illness suspected to be related to the Coronavirus (COVID-19)? Yes  Has the member been tested for the COVID-19? Yes   If Yes, what are the results of the COVID-19? Positive  What is the severity of the members condition (i.e. Isolation, Ventilator use)?  Droplet Plus   Additional Notes   detected       COVID 19 by Urban Yip RN         Review

## 2020-04-07 NOTE — PROGRESS NOTES
concerns for COVID-19 in light of lab work and CT chest findings. Possible progression to decompensation discussed given above mentioned concerns-- we also discussed potential need for intubation in these cases give rapid rate of decompensation. Patient is agreeable to intubate should her condition worsen and respiratory status become compromised. Review of Systems:     Constitutional:  negative for chills, fevers, sweats  Respiratory:  negative for cough, dyspnea on exertion, shortness of breath, wheezing  Cardiovascular:  negative for chest pain, chest pressure/discomfort, lower extremity edema, palpitations  Gastrointestinal:  negative for abdominal pain, constipation, diarrhea, nausea, vomiting  Neurological:  negative for dizziness, headache    Medications: Allergies:  No Known Allergies    Current Meds:   Scheduled Meds:    alogliptin  12.5 mg Oral Daily    guaiFENesin  600 mg Oral BID    hydroxychloroquine  200 mg Oral BID    azithromycin  250 mg Oral Daily    insulin glargine  12 Units Subcutaneous Daily    calcium carbonate  1,200 mg Oral BID    glipiZIDE  5 mg Oral QAM AC    insulin lispro  0-6 Units Subcutaneous TID WC    insulin lispro  0-3 Units Subcutaneous Nightly    sodium chloride flush  10 mL Intravenous 2 times per day    enoxaparin  40 mg Subcutaneous Daily     Continuous Infusions:    dextrose       PRN Meds: HYDROcodone 5 mg - acetaminophen, sodium chloride, albuterol sulfate HFA, glucose, dextrose, glucagon (rDNA), dextrose, magnesium sulfate, potassium chloride **OR** potassium alternative oral replacement **OR** potassium chloride, sodium chloride flush, acetaminophen **OR** acetaminophen, magnesium hydroxide, promethazine **OR** ondansetron, nicotine    Data:     Past Medical History:   has a past medical history of Diabetes mellitus ([facility] [address] Social History:   reports that she has never smoked. She has never used smokeless tobacco. She reports current alcohol use.  She 215*  --  247*  --  281*  --  276* 148*     ABG:No results found for: POCPH, PHART, PH, POCPCO2, LIA4MFX, PCO2, POCPO2, PO2ART, PO2, POCHCO3, XDH0IZF, HCO3, NBEA, PBEA, BEART, BE, THGBART, THB, AXI5OTN, QJHK4UFJ, Q6GMNGMT, O2SAT, FIO2  No results found for: SPECIAL  No results found for: CULTURE    Radiology:  Xr Chest Portable    Result Date: 4/6/2020  Worsening multifocal diffuse airspace disease bilaterally in this patient with reported COVID related pneumonia. No pneumothorax     Ct Chest Pulmonary Embolism W Contrast    Result Date: 3/31/2020  Suboptimal evaluation of the pulmonary arteries due to motion artifact. No convincing evidence for embolism or right heart strain. Multifocal ill-defined ground-glass and interstitial opacities within the lower lobes bilaterally and inferior segments of the upper lobes. No septated cavitation, lymphadenopathy, pleural effusions. Findings are nonspecific, however are concerning for atypical/viral pneumonitis.        Physical Examination:        General appearance:  alert, cooperative and no distress  Mental Status:  oriented to person, place and time and normal affect  Lungs: Coarse basilar crackles on auscultation bilaterally, normal effort  Heart:  regular rate and rhythm, no murmur  Abdomen:  soft, nontender, nondistended, normal bowel sounds, no masses, hepatomegaly, splenomegaly  Extremities:  no edema, redness, tenderness in the calves  Skin:  no gross lesions, rashes, induration    Assessment:        Hospital Problems           Last Modified POA    * (Principal) Acute respiratory disease due to COVID-19 virus 4/7/2020 Yes    Multifocal pneumonia 3/31/2020 Yes    Hypoxia 3/31/2020 Yes    Type 2 diabetes mellitus without complication, without long-term current use of insulin ([facility] [address] 3/31/2020 Yes    Hyponatremia 3/31/2020 Yes    Pneumonia due to SARS-associated coronavirus 4/2/2020 Yes    Suspected COVID-19 virus infection 4/5/2020 Yes    Anemia 4/7/2020 Yes

## 2020-04-08 PROBLEM — U07.1 PNEUMONIA DUE TO COVID-19 VIRUS: Status: ACTIVE | Noted: 2020-04-08

## 2020-04-08 PROBLEM — J12.82 PNEUMONIA DUE TO COVID-19 VIRUS: Status: ACTIVE | Noted: 2020-04-08

## 2020-04-08 LAB
ABSOLUTE EOS #: 0.05 K/UL (ref 0–0.44)
ABSOLUTE IMMATURE GRANULOCYTE: 0.15 K/UL (ref 0–0.3)
ABSOLUTE LYMPH #: 0.78 K/UL (ref 1.1–3.7)
ABSOLUTE MONO #: 0.43 K/UL (ref 0.1–1.2)
ALBUMIN SERPL-MCNC: 2.7 G/DL (ref 3.5–5.2)
ALBUMIN/GLOBULIN RATIO: 0.5 (ref 1–2.5)
ALP BLD-CCNC: 83 U/L (ref 35–104)
ALT SERPL-CCNC: 29 U/L (ref 5–33)
ANION GAP SERPL CALCULATED.3IONS-SCNC: 13 MMOL/L (ref 9–17)
AST SERPL-CCNC: 26 U/L
BASOPHILS # BLD: 1 % (ref 0–2)
BASOPHILS ABSOLUTE: 0.05 K/UL (ref 0–0.2)
BILIRUB SERPL-MCNC: 0.35 MG/DL (ref 0.3–1.2)
BUN BLDV-MCNC: 6 MG/DL (ref 8–23)
BUN/CREAT BLD: ABNORMAL (ref 9–20)
CALCIUM SERPL-MCNC: 9.5 MG/DL (ref 8.6–10.4)
CHLORIDE BLD-SCNC: 94 MMOL/L (ref 98–107)
CO2: 26 MMOL/L (ref 20–31)
CREAT SERPL-MCNC: 0.47 MG/DL (ref 0.5–0.9)
DIFFERENTIAL TYPE: ABNORMAL
EOSINOPHILS RELATIVE PERCENT: 1 % (ref 1–4)
ESTIMATED AVERAGE GLUCOSE: 361 MG/DL
GFR AFRICAN AMERICAN: >60 ML/MIN
GFR NON-AFRICAN AMERICAN: >60 ML/MIN
GFR SERPL CREATININE-BSD FRML MDRD: ABNORMAL ML/MIN/{1.73_M2}
GFR SERPL CREATININE-BSD FRML MDRD: ABNORMAL ML/MIN/{1.73_M2}
GLUCOSE BLD-MCNC: 199 MG/DL (ref 65–105)
GLUCOSE BLD-MCNC: 238 MG/DL (ref 70–99)
GLUCOSE BLD-MCNC: 247 MG/DL (ref 65–105)
GLUCOSE BLD-MCNC: 293 MG/DL (ref 65–105)
HBA1C MFR BLD: 14.2 % (ref 4–6)
HCT VFR BLD CALC: 51.7 % (ref 36.3–47.1)
HEMOGLOBIN: 18.3 G/DL (ref 11.9–15.1)
IMMATURE GRANULOCYTES: 4 %
LYMPHOCYTES # BLD: 19 % (ref 24–43)
MAGNESIUM: 1.9 MG/DL (ref 1.6–2.6)
MCH RBC QN AUTO: 33.2 PG (ref 25.2–33.5)
MCHC RBC AUTO-ENTMCNC: 35.4 G/DL (ref 28.4–34.8)
MCV RBC AUTO: 93.8 FL (ref 82.6–102.9)
MONOCYTES # BLD: 10 % (ref 3–12)
NRBC AUTOMATED: 0 PER 100 WBC
PDW BLD-RTO: 11.2 % (ref 11.8–14.4)
PLATELET # BLD: ABNORMAL K/UL (ref 138–453)
PLATELET ESTIMATE: ABNORMAL
PLATELET, FLUORESCENCE: NORMAL K/UL (ref 138–453)
PLATELET, IMMATURE FRACTION: NORMAL % (ref 1.1–10.3)
PMV BLD AUTO: ABNORMAL FL (ref 8.1–13.5)
POTASSIUM SERPL-SCNC: 4.3 MMOL/L (ref 3.7–5.3)
RBC # BLD: 5.51 M/UL (ref 3.95–5.11)
RBC # BLD: ABNORMAL 10*6/UL
SEG NEUTROPHILS: 65 % (ref 36–65)
SEGMENTED NEUTROPHILS ABSOLUTE COUNT: 2.72 K/UL (ref 1.5–8.1)
SODIUM BLD-SCNC: 133 MMOL/L (ref 135–144)
TOTAL PROTEIN: 7.7 G/DL (ref 6.4–8.3)
WBC # BLD: 4.2 K/UL (ref 3.5–11.3)
WBC # BLD: ABNORMAL 10*3/UL

## 2020-04-08 PROCEDURE — 85025 COMPLETE CBC W/AUTO DIFF WBC: CPT

## 2020-04-08 PROCEDURE — 93005 ELECTROCARDIOGRAM TRACING: CPT | Performed by: INTERNAL MEDICINE

## 2020-04-08 PROCEDURE — 99233 SBSQ HOSP IP/OBS HIGH 50: CPT | Performed by: INTERNAL MEDICINE

## 2020-04-08 PROCEDURE — 83735 ASSAY OF MAGNESIUM: CPT

## 2020-04-08 PROCEDURE — 6370000000 HC RX 637 (ALT 250 FOR IP): Performed by: INTERNAL MEDICINE

## 2020-04-08 PROCEDURE — 2060000000 HC ICU INTERMEDIATE R&B

## 2020-04-08 PROCEDURE — 99232 SBSQ HOSP IP/OBS MODERATE 35: CPT | Performed by: INTERNAL MEDICINE

## 2020-04-08 PROCEDURE — 2580000003 HC RX 258: Performed by: PHYSICIAN ASSISTANT

## 2020-04-08 PROCEDURE — 83036 HEMOGLOBIN GLYCOSYLATED A1C: CPT

## 2020-04-08 PROCEDURE — 6360000002 HC RX W HCPCS: Performed by: PHYSICIAN ASSISTANT

## 2020-04-08 PROCEDURE — 85055 RETICULATED PLATELET ASSAY: CPT

## 2020-04-08 PROCEDURE — 6370000000 HC RX 637 (ALT 250 FOR IP): Performed by: PHYSICIAN ASSISTANT

## 2020-04-08 PROCEDURE — 80053 COMPREHEN METABOLIC PANEL: CPT

## 2020-04-08 PROCEDURE — 82947 ASSAY GLUCOSE BLOOD QUANT: CPT

## 2020-04-08 RX ORDER — INSULIN GLARGINE 100 [IU]/ML
10 INJECTION, SOLUTION SUBCUTANEOUS NIGHTLY
Status: DISCONTINUED | OUTPATIENT
Start: 2020-04-08 | End: 2020-04-09 | Stop reason: HOSPADM

## 2020-04-08 RX ORDER — ALBUTEROL SULFATE 90 UG/1
2 AEROSOL, METERED RESPIRATORY (INHALATION) EVERY 6 HOURS PRN
Qty: 1 INHALER | Refills: 3 | Status: SHIPPED | OUTPATIENT
Start: 2020-04-08 | End: 2022-02-16

## 2020-04-08 RX ORDER — GLIPIZIDE 5 MG/1
5 TABLET ORAL
Qty: 60 TABLET | Refills: 3 | Status: SHIPPED | OUTPATIENT
Start: 2020-04-09 | End: 2022-02-16

## 2020-04-08 RX ORDER — UBIQUINOL 100 MG
1 CAPSULE ORAL 3 TIMES DAILY
Qty: 100 EACH | Refills: 3 | Status: SHIPPED | OUTPATIENT
Start: 2020-04-08

## 2020-04-08 RX ORDER — GUAIFENESIN 600 MG/1
600 TABLET, EXTENDED RELEASE ORAL 2 TIMES DAILY
Qty: 30 TABLET | Refills: 0 | Status: SHIPPED | OUTPATIENT
Start: 2020-04-08 | End: 2022-02-16

## 2020-04-08 RX ORDER — GUAIFENESIN/DEXTROMETHORPHAN 100-10MG/5
10 SYRUP ORAL 3 TIMES DAILY
Qty: 120 ML | Refills: 1 | Status: SHIPPED | OUTPATIENT
Start: 2020-04-08 | End: 2020-04-15

## 2020-04-08 RX ORDER — INSULIN GLARGINE 100 [IU]/ML
20 INJECTION, SOLUTION SUBCUTANEOUS DAILY
Qty: 5 PEN | Refills: 3 | Status: SHIPPED | OUTPATIENT
Start: 2020-04-08

## 2020-04-08 RX ORDER — ALOGLIPTIN 12.5 MG/1
12.5 TABLET, FILM COATED ORAL DAILY
Qty: 30 TABLET | Refills: 3 | Status: SHIPPED | OUTPATIENT
Start: 2020-04-08 | End: 2022-02-16

## 2020-04-08 RX ORDER — LANCETS 30 GAUGE
1 EACH MISCELLANEOUS DAILY
Qty: 100 EACH | Refills: 3 | Status: SHIPPED | OUTPATIENT
Start: 2020-04-08

## 2020-04-08 RX ORDER — GLUCOSAMINE HCL/CHONDROITIN SU 500-400 MG
CAPSULE ORAL
Qty: 100 STRIP | Refills: 3 | Status: SHIPPED | OUTPATIENT
Start: 2020-04-08

## 2020-04-08 RX ORDER — BLOOD-GLUCOSE METER
1 KIT MISCELLANEOUS DAILY PRN
Qty: 1 KIT | Refills: 0 | Status: SHIPPED | OUTPATIENT
Start: 2020-04-08

## 2020-04-08 RX ADMIN — HYDROCODONE BITARTRATE AND ACETAMINOPHEN 1 TABLET: 5; 325 TABLET ORAL at 04:52

## 2020-04-08 RX ADMIN — Medication 1200 MG: at 09:05

## 2020-04-08 RX ADMIN — SODIUM CHLORIDE, PRESERVATIVE FREE 10 ML: 5 INJECTION INTRAVENOUS at 21:52

## 2020-04-08 RX ADMIN — GUAIFENESIN 600 MG: 600 TABLET, EXTENDED RELEASE ORAL at 09:05

## 2020-04-08 RX ADMIN — SODIUM CHLORIDE, PRESERVATIVE FREE 10 ML: 5 INJECTION INTRAVENOUS at 09:05

## 2020-04-08 RX ADMIN — INSULIN GLARGINE 12 UNITS: 100 INJECTION, SOLUTION SUBCUTANEOUS at 09:05

## 2020-04-08 RX ADMIN — ENOXAPARIN SODIUM 40 MG: 40 INJECTION SUBCUTANEOUS at 09:05

## 2020-04-08 RX ADMIN — HYDROXYCHLOROQUINE SULFATE 200 MG: 200 TABLET ORAL at 09:05

## 2020-04-08 RX ADMIN — ALOGLIPTIN 12.5 MG: 12.5 TABLET, FILM COATED ORAL at 09:05

## 2020-04-08 RX ADMIN — INSULIN LISPRO 2 UNITS: 100 INJECTION, SOLUTION INTRAVENOUS; SUBCUTANEOUS at 09:05

## 2020-04-08 RX ADMIN — Medication 1200 MG: at 21:46

## 2020-04-08 RX ADMIN — GUAIFENESIN 600 MG: 600 TABLET, EXTENDED RELEASE ORAL at 21:46

## 2020-04-08 RX ADMIN — INSULIN GLARGINE 10 UNITS: 100 INJECTION, SOLUTION SUBCUTANEOUS at 22:23

## 2020-04-08 RX ADMIN — INSULIN LISPRO 3 UNITS: 100 INJECTION, SOLUTION INTRAVENOUS; SUBCUTANEOUS at 12:30

## 2020-04-08 RX ADMIN — AZITHROMYCIN 250 MG: 250 TABLET, FILM COATED ORAL at 09:05

## 2020-04-08 RX ADMIN — INSULIN LISPRO 2 UNITS: 100 INJECTION, SOLUTION INTRAVENOUS; SUBCUTANEOUS at 22:22

## 2020-04-08 RX ADMIN — HYDROXYCHLOROQUINE SULFATE 200 MG: 200 TABLET ORAL at 21:47

## 2020-04-08 RX ADMIN — HYDROCODONE BITARTRATE AND ACETAMINOPHEN 1 TABLET: 5; 325 TABLET ORAL at 11:59

## 2020-04-08 RX ADMIN — INSULIN LISPRO 1 UNITS: 100 INJECTION, SOLUTION INTRAVENOUS; SUBCUTANEOUS at 17:15

## 2020-04-08 RX ADMIN — GLIPIZIDE 5 MG: 5 TABLET ORAL at 05:14

## 2020-04-08 ASSESSMENT — PAIN DESCRIPTION - LOCATION: LOCATION: HEAD

## 2020-04-08 ASSESSMENT — ENCOUNTER SYMPTOMS
SORE THROAT: 0
BACK PAIN: 0
COUGH: 1
ABDOMINAL PAIN: 0
COLOR CHANGE: 0
PHOTOPHOBIA: 0
SHORTNESS OF BREATH: 1

## 2020-04-08 ASSESSMENT — PAIN DESCRIPTION - PAIN TYPE: TYPE: ACUTE PAIN

## 2020-04-08 ASSESSMENT — PAIN SCALES - GENERAL
PAINLEVEL_OUTOF10: 8
PAINLEVEL_OUTOF10: 5
PAINLEVEL_OUTOF10: 7

## 2020-04-08 ASSESSMENT — PAIN DESCRIPTION - DESCRIPTORS: DESCRIPTORS: ACHING

## 2020-04-08 NOTE — PLAN OF CARE
Ongoing     Problem: Daily Care:  Goal: Daily care needs are met  Description: Daily care needs are met  Outcome: Ongoing     Problem: Discharge Planning:  Goal: Patients continuum of care needs are met  Description: Patients continuum of care needs are met  Outcome: Ongoing

## 2020-04-08 NOTE — PROGRESS NOTES
controlled, and relaxed/non-labored respiratory effort. She does complain of chest heaviness in coorelation with coughing episodes. We did discuss concerns for COVID-19 in light of lab work and CT chest findings. Possible progression to decompensation discussed given above mentioned concerns-- we also discussed potential need for intubation in these cases give rapid rate of decompensation. Patient is agreeable to intubate should her condition worsen and respiratory status become compromised. Review of Systems:     Constitutional:  negative for chills, fevers, sweats  Respiratory:  negative for cough, dyspnea on exertion, shortness of breath, wheezing  Cardiovascular:  negative for chest pain, chest pressure/discomfort, lower extremity edema, palpitations  Gastrointestinal:  negative for abdominal pain, constipation, diarrhea, nausea, vomiting  Neurological:  negative for dizziness, headache    Medications:      Allergies:  No Known Allergies    Current Meds:   Scheduled Meds:    insulin glargine  10 Units Subcutaneous Nightly    alogliptin  12.5 mg Oral Daily    guaiFENesin  600 mg Oral BID    hydroxychloroquine  200 mg Oral BID    azithromycin  250 mg Oral Daily    insulin glargine  12 Units Subcutaneous Daily    calcium carbonate  1,200 mg Oral BID    glipiZIDE  5 mg Oral QAM AC    insulin lispro  0-6 Units Subcutaneous TID WC    insulin lispro  0-3 Units Subcutaneous Nightly    sodium chloride flush  10 mL Intravenous 2 times per day    enoxaparin  40 mg Subcutaneous Daily     Continuous Infusions:    dextrose       PRN Meds: HYDROcodone 5 mg - acetaminophen, sodium chloride, albuterol sulfate HFA, glucose, dextrose, glucagon (rDNA), dextrose, magnesium sulfate, potassium chloride **OR** potassium alternative oral replacement **OR** potassium chloride, sodium chloride flush, acetaminophen **OR** acetaminophen, magnesium hydroxide, promethazine **OR** ondansetron, nicotine    Data:     Past Medical History:   has a past medical history of Diabetes mellitus ([address] Social History:   reports that she has never smoked. She has never used smokeless tobacco. She reports current alcohol use. She reports that she does not use drugs. Family History: History reviewed. No pertinent family history. Vitals:  /73   Pulse 92   Temp 99.3 °F (37.4 °C) (Oral)   Resp 23   Ht 5' 9\" (1.753 m)   Wt 173 lb 1.6 oz (78.5 kg)   SpO2 90%   BMI 25.56 kg/m²   Temp (24hrs), Av.2 °F (37.3 °C), Min:99.1 °F (37.3 °C), Max:99.3 °F (37.4 °C)    Recent Labs     20  1733 04/07/20  2054 20  0853 20  1657   POCGLU 225* 305* 247* 199*       I/O (24Hr):     Intake/Output Summary (Last 24 hours) at 2020 1757  Last data filed at 2020 0930  Gross per 24 hour   Intake 630 ml   Output 500 ml   Net 130 ml       Labs:  Hematology:  Recent Labs     20  0620  0633   WBC 7.0 6.6 4.2   RBC 4.29 3.63* 5.51*   HGB 13.1 11.5* 18.3*   HCT 41.6 34.5* 51.7*   MCV 97.0 95.0 93.8   MCH 30.5 31.7 33.2   MCHC 31.5 33.3 35.4*   RDW 11.0* 11.2* 11.2*   * 527* See Reflexed IPF Result   MPV 10.3 9.4 NOT REPORTED   .2*  --   --      Chemistry:  Recent Labs     20  0645 20  1203 20  0620  0633   * 136 128*  --  133*   K 4.1 4.1 4.1  --  4.3   CL 96* 95* 92*  --  94*   CO2 25 27 23  --  26   GLUCOSE 215* 246* 381*  --  238*   BUN 5* 6* 10  --  6*   CREATININE 0.47* 0.52 0.62  --  0.47*   MG 1.8  --   --  1.7 1.9   ANIONGAP 13 14 13  --  13   LABGLOM >60 >60 >60  --  >60   GFRAA >60 >60 >60  --  >60   CALCIUM 9.4 9.5 8.8  --  9.5     Recent Labs     20  0645  20  1203  20  2148 20  0820 20  1250 20  1733 20  2054 20  0633 20  0853 20  1657   PROT 7.5  --  7.9  --   --   --   --   --   --  7.7  --   --    LABALBU 2.6*  --  2.8*  --   --   --   --   --   --  2.7*  --   -- LABA1C  --   --   --   --   --   --   --   --   --  14.2*  --   --    AST 52*  --  47*  --   --   --   --   --   --  26  --   --    ALT 52*  --  52*  --   --   --   --   --   --  29  --   --    LDH  --   --   --   --  378*  --   --   --   --   --   --   --    ALKPHOS 97  --  99  --   --   --   --   --   --  83  --   --    BILITOT 0.42  --  0.40  --   --   --   --   --   --  0.35  --   --    POCGLU  --    < >  --    < >  --  276* 148* 225* 305*  --  247* 199*    < > = values in this interval not displayed. ABG:No results found for: POCPH, PHART, PH, POCPCO2, YCX1YVW, PCO2, POCPO2, PO2ART, PO2, POCHCO3, UKC8QDN, HCO3, NBEA, PBEA, BEART, BE, THGBART, THB, NBM2KSR, IRRF2KXD, W5EYMQWI, O2SAT, FIO2  No results found for: SPECIAL  No results found for: CULTURE    Radiology:  Xr Chest Portable    Result Date: 4/6/2020  Worsening multifocal diffuse airspace disease bilaterally in this patient with reported COVID related pneumonia. No pneumothorax     Ct Chest Pulmonary Embolism W Contrast    Result Date: 3/31/2020  Suboptimal evaluation of the pulmonary arteries due to motion artifact. No convincing evidence for embolism or right heart strain. Multifocal ill-defined ground-glass and interstitial opacities within the lower lobes bilaterally and inferior segments of the upper lobes. No septated cavitation, lymphadenopathy, pleural effusions. Findings are nonspecific, however are concerning for atypical/viral pneumonitis.        Physical Examination:        General appearance:  alert, cooperative and no distress  Mental Status:  oriented to person, place and time and normal affect  Lungs: Coarse basilar crackles on auscultation bilaterally, normal effort  Heart:  regular rate and rhythm, no murmur  Abdomen:  soft, nontender, nondistended, normal bowel sounds, no masses, hepatomegaly, splenomegaly  Extremities:  no edema, redness, tenderness in the calves  Skin:  no gross lesions, rashes, induration    Assessment:

## 2020-04-08 NOTE — PROGRESS NOTES
risk group; 7 or above: High risk group  Parameters 3 2 1 0 1 2 3   Age    < 72   ? 65   RR ? 8  9-11 12-20  21-24 ? 25   O2 Sats ? 91 92-93 94-95 ? 96      Suppl O2  Yes  No      SBP ? 90  101-110 111-219   ? 220   HR ? 40  41-50 51-90  111-130 ? 131   Consciousness    Alert   Drowsiness, lethargy, or confusion   Temperature ? 35.0 C (95.0 F)  35.1-36.0 C 95.1-96.9 F 36.1-38.0 C 97.0-100.4 F 38.1-39.0 C 100.5-102.3 F ? 39.1 C ? 102.4 F        NEWS Score:5 on 4/2/20      Summary of relevant labs:  Labs:  Procalcitonin 0.1  WBC 6.5 - 5.5  Absolute lymphocyte count 1.5    Troponin high sensitive less than 6  ALT 19  Lactic acid 3.6     - 503 - 378   - 209 - 195  Ferritin 1205 - 1578  - 1857    Micro:  3/31 Respiratory PCR neg  3/31 Mycoplasma IgM  3/31 CO VID testing      Imaging:  CXR worsened bilat infiltrates 4/6        Chest x-ray multifocal bronchial thickening and groundglass opacities suspicious for atypical or viral pneumonia no consolidation seen or free effusion    CT scan 3/31 multifocal ill defined groundglass and interstitial opacities within both lower lobes. No lymph nodes enlargement no pleural effusions-highly suspicious for atypical viral pneumonia        I have personally reviewed the past medical history, past surgical history, medications, social history, and family history, and I haveupdated the database accordingly. Past Medical History:     Past Medical History:   Diagnosis Date    Diabetes mellitus ([facility] [address]        Past Surgical  History:   History reviewed. No pertinent surgical history.     Medications:      insulin glargine  10 Units Subcutaneous Nightly    alogliptin  12.5 mg Oral Daily    guaiFENesin  600 mg Oral BID    hydroxychloroquine  200 mg Oral BID    azithromycin  250 mg Oral Daily    insulin glargine  12 Units Subcutaneous Daily    calcium carbonate  1,200 mg Oral BID    glipiZIDE  5 mg Oral QAM AC    insulin lispro  0-6 Units Subcutaneous TID WC

## 2020-04-08 NOTE — PROGRESS NOTES
PULMONARY & CRITICAL CARE MEDICINE PROGRESS  NOTE     Patient:  Ирина Stephenson  MRN: [id number]  Admit date: 3/31/2020    SUBJECTIVE     I personally interviewed/examined the patient, reviewed interval history and interpreted all available radiographic, laboratory data at the time of service.       Chief Compliant/Reason for Initial Consult: Acute respiratory failure    Brief Hospital Course and Interval History:  Patient is currently on nasal cannula at 2 L/min  She reports subjective improvement in her symptoms  She has not had any fever, T-max 99.3  Patient is currently on azithromycin and Plaquenil  No overnight issues reported    Review of Systems:  General: negative for chills, fatigue or fever  ENT: negative for headaches, nasal congestion, sore throat or visual changes  Allergy and Immunology: negative for postnasal drip or seasonal allergies  Hematological and Lymphatic: negative for bleeding problems, swollen lymph nodes  Respiratory: positive for cough and shortness of breath negative for hemoptysis or wheezing  Cardiovascular: negative for edema or palpitations  Gastrointestinal: negative for abdominal pain, change in bowel habits or nausea/vomiting  Genito-Urinary: negative for dysuria or urinary frequency/urgency  Musculoskeletal: negative for joint pain or joint swelling  Neurological: negative for numbness/tingling, seizures or weakness  Dermatological: negative for pruritus or rash    OBJECTIVE     VITAL SIGNS:   LAST-  /77   Pulse 92   Temp 98.9 °F (37.2 °C) (Oral)   Resp 23   Ht 5' 9\" (1.753 m)   Wt 173 lb 1.6 oz (78.5 kg)   SpO2 96%   BMI 25.56 kg/m²   8-24 HR RANGE-  TEMP Temp  Av.1 °F (37.3 °C)  Min: 98.9 °F (37.2 °C)  Max: 99.3 °F (75.2 °C)   BP Systolic (03ZHS), HN , Min:114 , SFH:358      Diastolic (43AYA), QOZ:23, Min:73, Max:77     PULSE Pulse  Av.6  Min: 76  Max: 94   RR Resp  Av  Min: 23

## 2020-04-08 NOTE — CARE COORDINATION
Qualified for home O2-faxed face sheet, testing, DME order for O2 2L  PER n/c, face to face and, MAR to [facility]. Talked with Ted Michael. [facility] Delivered. 200- talked with patient about home care-OK with [facility]-referral faxed and called to [facility].

## 2020-04-08 NOTE — PLAN OF CARE
Problem: Falls - Risk of:  Goal: Will remain free from falls  Description: Will remain free from falls  4/8/2020 1112 by Eugene Chowdary RN  Outcome: Ongoing  4/8/2020 0653 by Eleni Greco RN  Outcome: Ongoing  Goal: Absence of physical injury  Description: Absence of physical injury  4/8/2020 1112 by Eugene Chowdary RN  Outcome: Ongoing  4/8/2020 0653 by Eleni Greco RN  Outcome: Ongoing     Problem: Breathing Pattern - Ineffective:  Goal: Ability to achieve and maintain a regular respiratory rate will improve  Description: Ability to achieve and maintain a regular respiratory rate will improve  4/8/2020 1112 by Eugene Chowdary RN  Outcome: Ongoing  4/8/2020 0653 by Eleni Greco RN  Outcome: Ongoing     Problem: Anxiety:  Goal: Level of anxiety will decrease  Description: Level of anxiety will decrease  4/8/2020 1112 by Eugene Chowdary RN  Outcome: Ongoing  4/8/2020 0653 by Eleni Greco RN  Outcome: Ongoing     Problem: Nutrition  Goal: Optimal nutrition therapy  4/8/2020 1112 by Eugene Chowdary RN  Outcome: Ongoing  4/8/2020 0653 by Eleni Greco RN  Outcome: Ongoing     Problem: Discharge Planning:  Goal: Discharged to appropriate level of care  Description: Discharged to appropriate level of care  4/8/2020 1112 by Eugene Chowdary RN  Outcome: Ongoing  4/8/2020 0653 by Eleni Greco RN  Outcome: Ongoing     Problem: Serum Glucose Level - Abnormal:  Goal: Ability to maintain appropriate glucose levels will improve  Description: Ability to maintain appropriate glucose levels will improve  4/8/2020 1112 by Eugene Chowdary RN  Outcome: Ongoing  4/8/2020 0653 by Eleni Greco RN  Outcome: Ongoing     Problem: Sensory Perception - Impaired:  Goal: Ability to maintain a stable neurologic state will improve  Description: Ability to maintain a stable neurologic state will improve  4/8/2020 1112 by Eugene Chowdary RN  Outcome: Ongoing  4/8/2020 0653 by Eleni Greco RN  Outcome: Ongoing     Problem: Pain:  Goal: Pain level will decrease  Description: Pain level will decrease  4/8/2020 1112 by Bishnu Lopez RN  Outcome: Ongoing  4/8/2020 0653 by Romie Rodriguez RN  Outcome: Ongoing  Goal: Control of acute pain  Description: Control of acute pain  4/8/2020 1112 by Bishnu Lopez RN  Outcome: Ongoing  4/8/2020 0653 by Romie Rodriguez RN  Outcome: Ongoing  Goal: Control of chronic pain  Description: Control of chronic pain  4/8/2020 1112 by Bishnu Lopez RN  Outcome: Ongoing  4/8/2020 0653 by Romie Rodriguez RN  Outcome: Ongoing  Goal: Patient's pain/discomfort is manageable  Description: Patient's pain/discomfort is manageable  4/8/2020 1112 by Bishnu Lopez RN  Outcome: Ongoing  4/8/2020 0653 by Romie Rodriguez RN  Outcome: Ongoing     Problem: Infection:  Goal: Will remain free from infection  Description: Will remain free from infection  4/8/2020 1112 by Bishnu Lopez RN  Outcome: Ongoing  4/8/2020 0653 by Romie Rodriguez RN  Outcome: Ongoing     Problem: Safety:  Goal: Free from accidental physical injury  Description: Free from accidental physical injury  4/8/2020 1112 by Bishnu Lopez RN  Outcome: Ongoing  4/8/2020 0653 by Romie Rodriguez RN  Outcome: Ongoing  Goal: Free from intentional harm  Description: Free from intentional harm  4/8/2020 1112 by Bishnu Lopez RN  Outcome: Ongoing  4/8/2020 0653 by Romie Rodriguez RN  Outcome: Ongoing     Problem: Daily Care:  Goal: Daily care needs are met  Description: Daily care needs are met  4/8/2020 1112 by Bishnu Lopez RN  Outcome: Ongoing  4/8/2020 0653 by Romie Rodriguez RN  Outcome: Ongoing     Problem: Discharge Planning:  Goal: Patients continuum of care needs are met  Description: Patients continuum of care needs are met  4/8/2020 1112 by Bishnu Lopez RN  Outcome: Ongoing  4/8/2020 0653 by Romie Rodriguez RN  Outcome: Ongoing   Questions and concerns addressed as needed.

## 2020-04-08 NOTE — CARE COORDINATION
Home Oxygen Evaluation    Home Oxygen Evaluation completed. Patient is on 2 liters per minute via n/c. Resting SpO2 = 97%  Resting SpO2 on room air = 95%    SpO2 on room air with exercise = 84%  SpO2 on oxygen as above with exercise = 95%    Nocturnal Oximetry with patient on room air is recommended is SpO2 is between 89% and 95% (requires additional order).     Uche Pillai  4:12 PM

## 2020-04-08 NOTE — DISCHARGE SUMMARY
Bernadine Wagner 19    Discharge Summary     Patient ID: Emely Vizcarra  :  1960   MRN: [id number]     ACCOUNT:  [de-identified]   Patient's PCP: Magalis Goldberg MD  Admit Date: 3/31/2020   Discharge Date: 2020     Length of Stay: 8  Code Status:  Full Code  Admitting Physician: Jarek Bailey MD  Discharge Physician: Regi Shell MD     Active Discharge Diagnoses:     Hospital Problem Lists:  Principal Problem:    Acute respiratory disease due to COVID-19 virus  Active Problems:    Hypoxia    Type 2 diabetes mellitus without complication, without long-term current use of insulin (HCC)    Hyponatremia    Pneumonia due to SARS-associated coronavirus    Anemia    Pneumonia due to COVID-19 virus    Viral sepsis ([facility] [address]  Resolved Problems:    * No resolved hospital problems. *      Admission Condition:  serious     Discharged Condition: good    Hospital Stay:     Hospital Course:  Marie Padron is a 59 y.o. Non-/non  female who presents with Cough; Fever; and Shortness of Breath  and is admitted to the hospital for the management of <principal problem not specified>. This is a 60 yr old female who presents with 5 day history of increasing fatigue, dry cough, shortness of breath, myalgias, and decreased appetite.  Patient only has history of DMII, denies any lung disease or cardiac history.  Patient denies fevers at home, however, TMax in ER:103, she was also found to be hypoxic on arrival with oxygen saturation in low 80s-- she does not normally require oxygen at baseline. Presenting labs reveal: CT chest with evidence of bilateral ground glass opacities/viral PNA, Lactic Acid: 3.6, CRP:113, mild elevation in transaminases, Ferritin: 1,205 (procalcitonin and LDH pending), Viral pathogen panel pending, expect COVID-19 testing per ID.  On my evaluation, patient remains on 4L oxyegn with saturation at 97%, HR controlled, and relaxed/non-labored respiratory effort.  She does complain of chest heaviness in coorelation with coughing episodes.  We did discuss concerns for COVID-19 in light of lab work and CT chest findings.  Possible progression to decompensation discussed given above mentioned concerns-- we also discussed potential need for intubation in these cases give rapid rate of decompensation.  Patient is agreeable to intubate should her condition worsen and respiratory status become compromised. Admitted with circumstances as above and managed successfully and conservatively with empiric azithromycin and Plaquenil. Patient was found to have markedly elevated blood sugars and HbA1c obtained today was 14.2. This does warrant the addition of at least the long-term insulin to optimize control. Patient also did qualify for home oxygen and therefore arrangement is being made. ID consult did address the prescriptions for the Plaquenil and azithromycin. Patient will be discharged home with home health follow-up if available to coordinate her care. She will need oxygen supplementation with ambulation and should use it continuously until discontinued by a physicians. She has been discharged in a stable state. Patient had sepsis due to the COVID as reported in the previous progress notes, but had since resolved when I took over.     Physical Examination:         General appearance:  alert, cooperative and no distress  Mental Status:  oriented to person, place and time and normal affect  Lungs: Coarse basilar crackles on auscultation bilaterally, normal effort  Heart:  regular rate and rhythm, no murmur  Abdomen:  soft, nontender, nondistended, normal bowel sounds, no masses, hepatomegaly, splenomegaly  Extremities:  no edema, redness, tenderness in the calves  Skin:  no gross lesions, rashes, induration      Significant therapeutic interventions: Conservative management with input from both ID and pulmonology you enter a healthcare provider's office. ; If you are caring for others: If the person who is sick is not able to wear a facemask (for example, because it causes trouble breathing), then people who live with the person who is sick should not stay in the same room with them, or they should wear a facemask if they enter a room with the person who is sick. Cover your coughs and sneezes   ; Cover: Cover your mouth and nose with a tissue when you cough or sneeze.   ; Dispose: Throw used tissues in a lined trash can.   ; Wash hands: Immediately wash your hands with soap and water for at least 20 seconds or, if soap and water are not available, clean your hands with an alcohol-based hand  that contains at least 60% alcohol. Clean your hands often   ; Wash hands: Wash your hands often with soap and water for at least 20 seconds, especially after blowing your nose, coughing, or sneezing; going to the bathroom; and before eating or preparing food.   ; Hand : If soap and water are not readily available, use an alcohol-based hand  with at least 60% alcohol, covering all surfaces of your hands and rubbing them together until they feel dry.   ; Soap and water: Soap and water are the best option if hands are visibly dirty.   ; Avoid touching: Avoid touching your eyes, nose, and mouth with unwashed hands. Handwashing Tips   ; Wet your hands with clean, running water (warm or cold), turn off the tap, and apply soap.  ; Lather your hands by rubbing them together with the soap. Lather the backs of your hands, between your fingers, and under your nails. ; Scrub your hands for at least 20 seconds. Need a timer? Hum the [place] from beginning to end twice.  ; Rinse your hands well under clean, running water.  ; Dry your hands using a clean towel or air dry them. Avoid sharing personal household items   ; Do not share:  You should not share dishes, drinking glasses, cups, eating utensils, towels, or bedding with other people or pets in your home.   ; Wash thoroughly after use: After using these items, they should be washed thoroughly with soap and water. Clean all high-touch surfaces everyday   ; Clean and disinfect: Practice routine cleaning of high touch surfaces.  ; High touch surfaces include counters, tabletops, doorknobs, bathroom fixtures, toilets, phones, keyboards, tablets, and bedside tables.  ; Disinfect areas with bodily fluids: Also, clean any surfaces that may have blood, stool, or body fluids on them.   ; Household : Use a household cleaning spray or wipe, according to the label instructions. Labels contain instructions for safe and effective use of the cleaning product including precautions you should take when applying the product, such as wearing gloves and making sure you have good ventilation during use of the product. Monitor your symptoms   Seek medical attention: Seek prompt medical attention if your illness is worsening     (e.g., difficulty breathing).   ; Call your doctor: Before seeking care, call your healthcare provider and tell them that you have, or are being evaluated for, COVID-19.   ; Wear a facemask when sick: Put on a facemask before you enter the facility. These steps will help the healthcare provider's office to keep other people in the office or waiting room from getting infected or exposed. ; Alert health department: Ask your healthcare provider to call the local or state health department. Persons who are placed under active monitoring or facilitated self-monitoring should follow instructions provided by their local health department or occupational health professionals, as appropriate.  ; Call 911 if you have a medical emergency: If you have a medical emergency and need to call 911, notify the dispatch personnel that you have, or are being evaluated for COVID-19.  If possible, put on a facemask before emergency medical services arrive. Discharge Medications:      Medication List      START taking these medications    albuterol sulfate  (90 Base) MCG/ACT inhaler  Inhale 2 puffs into the lungs every 6 hours as needed for Wheezing     alogliptin 12.5 MG Tabs tablet  Commonly known as:  NESINA  Take 1 tablet by mouth daily     azithromycin 250 MG tablet  Commonly known as:  ZITHROMAX  Take 1 tablet by mouth daily for 3 days     calcium carbonate 1500 (600 Ca) MG Tabs tablet  Take 2 tablets by mouth 2 times daily     glipiZIDE 5 MG tablet  Commonly known as:  GLUCOTROL  Take 1 tablet by mouth every morning (before breakfast)  Start taking on:  April 9, 2020     guaiFENesin 600 MG extended release tablet  Commonly known as:  MUCINEX  Take 1 tablet by mouth 2 times daily     hydroxychloroquine 200 MG tablet  Commonly known as:  PLAQUENIL  Take 1 tablet by mouth 2 times daily for 4 days     Insulin Pen Needle 31G X 5 MM Misc  1 each by Does not apply route daily     Lantus SoloStar 100 UNIT/ML injection pen  Generic drug:  insulin glargine  Inject 20 Units into the skin daily        CHANGE how you take these medications    metFORMIN 1000 MG tablet  Commonly known as:  GLUCOPHAGE  Take 1 tablet by mouth 2 times daily (with meals)  What changed:  when to take this           Where to Get Your Medications      These medications were sent to [facility] [address] [place] [address] [address], [address]  [id number]    Phone:  [phone]   · albuterol sulfate  (90 Base) MCG/ACT inhaler  · alogliptin 12.5 MG Tabs tablet  · azithromycin 250 MG tablet  · calcium carbonate 1500 (600 Ca) MG Tabs tablet  · glipiZIDE 5 MG tablet  · guaiFENesin 600 MG extended release tablet  · hydroxychloroquine 200 MG tablet  · Insulin Pen Needle 31G X 5 MM Misc  · Lantus SoloStar 100 UNIT/ML injection pen  · metFORMIN 1000 MG tablet         No discharge procedures on file.     Time Spent on discharge is 38 mins in patient examination, evaluation, counseling as well as medication reconciliation, prescriptions for required medications, discharge plan and follow up. Electronically signed by   Mojgan Brennan MD  4/9/2020  10:44 AM      Thank you Dr. Chalino Medel MD for the opportunity to be involved in this patient's care.

## 2020-04-09 VITALS
OXYGEN SATURATION: 96 % | DIASTOLIC BLOOD PRESSURE: 77 MMHG | TEMPERATURE: 98.9 F | HEART RATE: 92 BPM | BODY MASS INDEX: 25.64 KG/M2 | RESPIRATION RATE: 21 BRPM | SYSTOLIC BLOOD PRESSURE: 120 MMHG | WEIGHT: 173.1 LBS | HEIGHT: 69 IN

## 2020-04-09 PROBLEM — B97.89 VIRAL SEPSIS (HCC): Status: ACTIVE | Noted: 2020-04-09

## 2020-04-09 PROBLEM — A41.89 VIRAL SEPSIS (HCC): Status: ACTIVE | Noted: 2020-04-09

## 2020-04-09 LAB
ABSOLUTE EOS #: 0.06 K/UL (ref 0–0.44)
ABSOLUTE IMMATURE GRANULOCYTE: 0.12 K/UL (ref 0–0.3)
ABSOLUTE LYMPH #: 1.17 K/UL (ref 1.1–3.7)
ABSOLUTE MONO #: 0.73 K/UL (ref 0.1–1.2)
BASOPHILS # BLD: 1 % (ref 0–2)
BASOPHILS ABSOLUTE: 0.03 K/UL (ref 0–0.2)
DIFFERENTIAL TYPE: ABNORMAL
EKG ATRIAL RATE: 87 BPM
EKG P AXIS: 61 DEGREES
EKG P-R INTERVAL: 154 MS
EKG Q-T INTERVAL: 358 MS
EKG QRS DURATION: 80 MS
EKG QTC CALCULATION (BAZETT): 430 MS
EKG R AXIS: 26 DEGREES
EKG T AXIS: 63 DEGREES
EKG VENTRICULAR RATE: 87 BPM
EOSINOPHILS RELATIVE PERCENT: 1 % (ref 1–4)
GLUCOSE BLD-MCNC: 192 MG/DL (ref 65–105)
GLUCOSE BLD-MCNC: 202 MG/DL (ref 65–105)
HCT VFR BLD CALC: 35.2 % (ref 36.3–47.1)
HEMOGLOBIN: 11.7 G/DL (ref 11.9–15.1)
IMMATURE GRANULOCYTES: 2 %
LYMPHOCYTES # BLD: 21 % (ref 24–43)
MAGNESIUM: 2 MG/DL (ref 1.6–2.6)
MCH RBC QN AUTO: 31.7 PG (ref 25.2–33.5)
MCHC RBC AUTO-ENTMCNC: 33.2 G/DL (ref 28.4–34.8)
MCV RBC AUTO: 95.4 FL (ref 82.6–102.9)
MONOCYTES # BLD: 13 % (ref 3–12)
NRBC AUTOMATED: 0 PER 100 WBC
PDW BLD-RTO: 11 % (ref 11.8–14.4)
PLATELET # BLD: 644 K/UL (ref 138–453)
PLATELET ESTIMATE: ABNORMAL
PMV BLD AUTO: 9.4 FL (ref 8.1–13.5)
RBC # BLD: 3.69 M/UL (ref 3.95–5.11)
RBC # BLD: ABNORMAL 10*6/UL
SEG NEUTROPHILS: 62 % (ref 36–65)
SEGMENTED NEUTROPHILS ABSOLUTE COUNT: 3.49 K/UL (ref 1.5–8.1)
WBC # BLD: 5.6 K/UL (ref 3.5–11.3)
WBC # BLD: ABNORMAL 10*3/UL

## 2020-04-09 PROCEDURE — 6370000000 HC RX 637 (ALT 250 FOR IP): Performed by: INTERNAL MEDICINE

## 2020-04-09 PROCEDURE — 85025 COMPLETE CBC W/AUTO DIFF WBC: CPT

## 2020-04-09 PROCEDURE — 94761 N-INVAS EAR/PLS OXIMETRY MLT: CPT

## 2020-04-09 PROCEDURE — 82947 ASSAY GLUCOSE BLOOD QUANT: CPT

## 2020-04-09 PROCEDURE — 6370000000 HC RX 637 (ALT 250 FOR IP): Performed by: PHYSICIAN ASSISTANT

## 2020-04-09 PROCEDURE — 93010 ELECTROCARDIOGRAM REPORT: CPT | Performed by: INTERNAL MEDICINE

## 2020-04-09 PROCEDURE — G0108 DIAB MANAGE TRN  PER INDIV: HCPCS

## 2020-04-09 PROCEDURE — 99233 SBSQ HOSP IP/OBS HIGH 50: CPT | Performed by: INTERNAL MEDICINE

## 2020-04-09 PROCEDURE — 2580000003 HC RX 258: Performed by: PHYSICIAN ASSISTANT

## 2020-04-09 PROCEDURE — 83735 ASSAY OF MAGNESIUM: CPT

## 2020-04-09 RX ORDER — HYDROCODONE BITARTRATE AND ACETAMINOPHEN 5; 325 MG/1; MG/1
1 TABLET ORAL EVERY 4 HOURS PRN
Qty: 18 TABLET | Refills: 0 | Status: SHIPPED | OUTPATIENT
Start: 2020-04-09 | End: 2020-04-12

## 2020-04-09 RX ADMIN — GUAIFENESIN 600 MG: 600 TABLET, EXTENDED RELEASE ORAL at 10:34

## 2020-04-09 RX ADMIN — AZITHROMYCIN 250 MG: 250 TABLET, FILM COATED ORAL at 10:33

## 2020-04-09 RX ADMIN — HYDROCODONE BITARTRATE AND ACETAMINOPHEN 1 TABLET: 5; 325 TABLET ORAL at 10:55

## 2020-04-09 RX ADMIN — SODIUM CHLORIDE, PRESERVATIVE FREE 10 ML: 5 INJECTION INTRAVENOUS at 10:34

## 2020-04-09 RX ADMIN — INSULIN GLARGINE 12 UNITS: 100 INJECTION, SOLUTION SUBCUTANEOUS at 10:35

## 2020-04-09 RX ADMIN — ALOGLIPTIN 12.5 MG: 12.5 TABLET, FILM COATED ORAL at 10:33

## 2020-04-09 RX ADMIN — INSULIN LISPRO 2 UNITS: 100 INJECTION, SOLUTION INTRAVENOUS; SUBCUTANEOUS at 12:59

## 2020-04-09 RX ADMIN — Medication 1200 MG: at 10:33

## 2020-04-09 RX ADMIN — GLIPIZIDE 5 MG: 5 TABLET ORAL at 06:19

## 2020-04-09 RX ADMIN — HYDROXYCHLOROQUINE SULFATE 200 MG: 200 TABLET ORAL at 10:33

## 2020-04-09 RX ADMIN — INSULIN LISPRO 1 UNITS: 100 INJECTION, SOLUTION INTRAVENOUS; SUBCUTANEOUS at 10:35

## 2020-04-09 ASSESSMENT — PAIN SCALES - GENERAL: PAINLEVEL_OUTOF10: 5

## 2020-04-09 NOTE — PROGRESS NOTES
Inpatient Diabetes  Education     Type and Reason for Visit: Patient Education   Writer coordinated care for patient with primary RN nurse [facility]. Met and discussed patient home insulin plan, provided to nurse insulin demo pen, pen tips, and injection pad for patient to practice. Also provided diabetes education folder and handouts and home support materials. Per chart review patient does have home BG experience as she had self reported BG to her pcp Dr Octavia Alvarenga. Writer did send letter and request outpatient DMED referral.   Noted home care is planned and writer updated [address] with info that patient is new to insulin for home Nurse to also support self care skill.    2: 45 pm Writer called into patient room to follow up and see if any additional questions or needs. Patient was able to talk with writer on phone and stated she had practiced use of insulin pen into injection pad. She also had self administer insulin with vial and syringe in hospital. Briefly reviewed best self care practices for use of insulin including site rotation and sharps disposal.  Verbally reviewed following information with patient also : Blood glucose targets, hypo and hyperglycemia, importance of home blood glucose monitoring,   Written educational materials provided  _x__ Educational Booklets as available \" BD booklet  On starting insulin\"  _x__ Be safe with Needles teaching sheet /  [employer]  _x__\"Type 2 Diabetes and Adding Insulin\" fold out with survival skills   · Contact number provided.      Oswaldo Tran RN CDE

## 2020-04-10 ENCOUNTER — CARE COORDINATION (OUTPATIENT)
Dept: CASE MANAGEMENT | Age: 60
End: 2020-04-10

## 2020-04-10 NOTE — CARE COORDINATION
increased shortness of breath, increasing fever and signs of decompensation with patient who verbalized understanding. Discussed exposure protocols and quarantine with CDC Guidelines What to do if you are sick with coronavirus disease 2019 Patient was given an opportunity for questions and concerns. The patient agrees to contact the [facility] exposure line [phone], local health department [place] Department of Health: ([phone]) and PCP office for questions related to their healthcare. CTN/ACM provided contact information for future reference. Reviewed and educated patient on any new and changed medications related to discharge diagnosis     Patient/family/caregiver given information for [facility] Loop and agrees to enroll no  Patient's preferred e-mail:    Patient's preferred phone number:   Based on Loop alert triggers, patient will be contacted by nurse care manager for worsening symptoms. Plan for follow-up call in 3-5 days based on severity of symptoms and risk factors    N  Follow Up  No future appointments.     Zari Bolanos RN

## 2020-04-13 ENCOUNTER — CARE COORDINATION (OUTPATIENT)
Dept: CASE MANAGEMENT | Age: 60
End: 2020-04-13

## 2020-04-13 NOTE — CARE COORDINATION
[facility] Transitions Follow Up Call    2020    Patient: Renetta Rader  Patient : 1960   MRN: [id number]  Reason for Admission: COVID-19 Monitoring  Discharge Date: 20 RARS: Readmission Risk Score: 16         Spoke with: .osei    Was able to contact Marie for follow up outreach. Encouraged pt to call PCP for appointment and medication substitution. VU.    COVID-19 Screening Follow-up Note    Patient contacted regarding COVID-19 risk and screening. Care Transition Nurse/ Ambulatory Care Manager contacted the patient by telephone to perform follow-up assessment. Verified name and  with patient as identifiers. Patient has following risk factors of: COPD and pneumonia        Symptoms reviewed with patient who verbalized the following symptoms: fatigue, shortness of breath and no new/worsening symptoms       Due to no new or worsening symptoms encounter was not routed to provider for escalation. Education provided regarding infection prevention, and signs and symptoms of COVID-19 and when to seek medical attention with patient who verbalized understanding. Discussed exposure protocols and quarantine from [address] you at higher risk for severe illness  and given an opportunity for questions and concerns. The patient agrees to contact the COVID-19 hotline [phone] or PCP office for questions related to their healthcare. CTN/ACM provided contact information for future reference. From CDC: Are you at higher risk for severe illness?  Wash your hands often.  Avoid close contact (6 feet, which is about two arm lengths) with people who are sick.  Put distance between yourself and other people if COVID-19 is spreading in your community.  Clean and disinfect frequently touched surfaces.  Avoid all cruise travel and non-essential air travel.    Call your healthcare professional if you have concerns about COVID-19 and your underlying condition or if you are

## 2020-04-17 LAB
CULTURE: NORMAL
DIRECT EXAM: NORMAL
DIRECT EXAM: NORMAL
Lab: NORMAL
Lab: NORMAL
SPECIMEN DESCRIPTION: NORMAL
SPECIMEN DESCRIPTION: NORMAL

## 2020-04-21 ENCOUNTER — CARE COORDINATION (OUTPATIENT)
Dept: CASE MANAGEMENT | Age: 60
End: 2020-04-21

## 2020-05-04 RX ORDER — HYDROXYCHLOROQUINE SULFATE 200 MG/1
TABLET, FILM COATED ORAL
Qty: 8 TABLET | Refills: 0 | Status: SHIPPED | OUTPATIENT
Start: 2020-05-04 | End: 2022-02-16

## 2020-05-04 RX ORDER — AZITHROMYCIN 250 MG/1
TABLET, FILM COATED ORAL
Qty: 3 TABLET | Refills: 0 | Status: SHIPPED | OUTPATIENT
Start: 2020-05-04 | End: 2022-02-16

## 2020-11-09 ENCOUNTER — HOSPITAL ENCOUNTER (EMERGENCY)
Age: 60
Discharge: HOME | End: 2020-11-09
Attending: EMERGENCY MEDICINE

## 2020-11-09 VITALS
RESPIRATION RATE: 16 BRPM | BODY MASS INDEX: 26.66 KG/M2 | TEMPERATURE: 97.9 F | HEIGHT: 69 IN | DIASTOLIC BLOOD PRESSURE: 87 MMHG | OXYGEN SATURATION: 99 % | WEIGHT: 180 LBS | SYSTOLIC BLOOD PRESSURE: 135 MMHG | HEART RATE: 89 BPM

## 2020-11-09 PROCEDURE — 99284 EMERGENCY DEPT VISIT MOD MDM: CPT

## 2020-11-09 PROCEDURE — 6370000000 HC RX 637 (ALT 250 FOR IP): Performed by: STUDENT IN AN ORGANIZED HEALTH CARE EDUCATION/TRAINING PROGRAM

## 2020-11-09 RX ORDER — IBUPROFEN 800 MG/1
800 TABLET ORAL EVERY 8 HOURS PRN
Qty: 30 TABLET | Refills: 0 | Status: SHIPPED | OUTPATIENT
Start: 2020-11-09

## 2020-11-09 RX ORDER — IBUPROFEN 800 MG/1
800 TABLET ORAL ONCE
Status: COMPLETED | OUTPATIENT
Start: 2020-11-09 | End: 2020-11-09

## 2020-11-09 RX ADMIN — IBUPROFEN 800 MG: 800 TABLET ORAL at 10:19

## 2020-11-09 ASSESSMENT — PAIN DESCRIPTION - LOCATION: LOCATION: BACK;ARM

## 2020-11-09 ASSESSMENT — ENCOUNTER SYMPTOMS
NAUSEA: 0
BACK PAIN: 1
WHEEZING: 0
SHORTNESS OF BREATH: 0
VOMITING: 0
ABDOMINAL PAIN: 0

## 2020-11-09 ASSESSMENT — PAIN DESCRIPTION - FREQUENCY: FREQUENCY: CONTINUOUS

## 2020-11-09 ASSESSMENT — PAIN SCALES - GENERAL
PAINLEVEL_OUTOF10: 5
PAINLEVEL_OUTOF10: 5

## 2020-11-09 ASSESSMENT — PAIN DESCRIPTION - PAIN TYPE: TYPE: ACUTE PAIN

## 2020-11-09 ASSESSMENT — PAIN DESCRIPTION - DESCRIPTORS: DESCRIPTORS: ACHING
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
## 2022-02-16 ENCOUNTER — HOSPITAL ENCOUNTER (EMERGENCY)
Age: 62
Discharge: HOME | End: 2022-02-16
Attending: EMERGENCY MEDICINE
Payer: COMMERCIAL

## 2022-02-16 ENCOUNTER — APPOINTMENT (OUTPATIENT)
Dept: CT IMAGING | Age: 62
End: 2022-02-16
Payer: COMMERCIAL

## 2022-02-16 VITALS
OXYGEN SATURATION: 100 % | HEART RATE: 84 BPM | HEIGHT: 69 IN | RESPIRATION RATE: 16 BRPM | DIASTOLIC BLOOD PRESSURE: 96 MMHG | WEIGHT: 214.6 LBS | TEMPERATURE: 98.3 F | SYSTOLIC BLOOD PRESSURE: 132 MMHG | BODY MASS INDEX: 31.78 KG/M2

## 2022-02-16 DIAGNOSIS — J01.10 ACUTE FRONTAL SINUSITIS, RECURRENCE NOT SPECIFIED: Primary | ICD-10-CM

## 2022-02-16 LAB
ABSOLUTE EOS #: 0.16 K/UL (ref 0–0.44)
ABSOLUTE IMMATURE GRANULOCYTE: 0.05 K/UL (ref 0–0.3)
ABSOLUTE LYMPH #: 1.99 K/UL (ref 1.1–3.7)
ABSOLUTE MONO #: 0.46 K/UL (ref 0.1–1.2)
ANION GAP SERPL CALCULATED.3IONS-SCNC: 11 MMOL/L (ref 9–17)
BASOPHILS # BLD: 1 % (ref 0–2)
BASOPHILS ABSOLUTE: 0.05 K/UL (ref 0–0.2)
BUN BLDV-MCNC: 9 MG/DL (ref 8–23)
BUN/CREAT BLD: 14 (ref 9–20)
CALCIUM SERPL-MCNC: 8.8 MG/DL (ref 8.6–10.4)
CHLORIDE BLD-SCNC: 105 MMOL/L (ref 98–107)
CO2: 24 MMOL/L (ref 20–31)
CREAT SERPL-MCNC: 0.63 MG/DL (ref 0.5–0.9)
DIFFERENTIAL TYPE: ABNORMAL
EOSINOPHILS RELATIVE PERCENT: 3 % (ref 1–4)
GFR AFRICAN AMERICAN: >60 ML/MIN
GFR NON-AFRICAN AMERICAN: >60 ML/MIN
GFR SERPL CREATININE-BSD FRML MDRD: ABNORMAL ML/MIN/{1.73_M2}
GFR SERPL CREATININE-BSD FRML MDRD: ABNORMAL ML/MIN/{1.73_M2}
GLUCOSE BLD-MCNC: 207 MG/DL (ref 70–99)
HCT VFR BLD CALC: 39.8 % (ref 36.3–47.1)
HEMOGLOBIN: 13 G/DL (ref 11.9–15.1)
IMMATURE GRANULOCYTES: 1 %
LYMPHOCYTES # BLD: 38 % (ref 24–43)
MCH RBC QN AUTO: 30.2 PG (ref 25.2–33.5)
MCHC RBC AUTO-ENTMCNC: 32.7 G/DL (ref 28.4–34.8)
MCV RBC AUTO: 92.6 FL (ref 82.6–102.9)
MONOCYTES # BLD: 9 % (ref 3–12)
NRBC AUTOMATED: 0 PER 100 WBC
PDW BLD-RTO: 11.3 % (ref 11.8–14.4)
PLATELET # BLD: 320 K/UL (ref 138–453)
PLATELET ESTIMATE: ABNORMAL
PMV BLD AUTO: 9.4 FL (ref 8.1–13.5)
POTASSIUM SERPL-SCNC: 4.9 MMOL/L (ref 3.7–5.3)
RBC # BLD: 4.3 M/UL (ref 3.95–5.11)
RBC # BLD: ABNORMAL 10*6/UL
SEG NEUTROPHILS: 48 % (ref 36–65)
SEGMENTED NEUTROPHILS ABSOLUTE COUNT: 2.51 K/UL (ref 1.5–8.1)
SODIUM BLD-SCNC: 140 MMOL/L (ref 135–144)
WBC # BLD: 5.2 K/UL (ref 3.5–11.3)
WBC # BLD: ABNORMAL 10*3/UL

## 2022-02-16 PROCEDURE — 80048 BASIC METABOLIC PNL TOTAL CA: CPT

## 2022-02-16 PROCEDURE — 96374 THER/PROPH/DIAG INJ IV PUSH: CPT

## 2022-02-16 PROCEDURE — 2580000003 HC RX 258: Performed by: PHYSICIAN ASSISTANT

## 2022-02-16 PROCEDURE — 6360000002 HC RX W HCPCS: Performed by: PHYSICIAN ASSISTANT

## 2022-02-16 PROCEDURE — 99282 EMERGENCY DEPT VISIT SF MDM: CPT

## 2022-02-16 PROCEDURE — 96361 HYDRATE IV INFUSION ADD-ON: CPT

## 2022-02-16 PROCEDURE — 85025 COMPLETE CBC W/AUTO DIFF WBC: CPT

## 2022-02-16 PROCEDURE — 70450 CT HEAD/BRAIN W/O DYE: CPT

## 2022-02-16 PROCEDURE — 96375 TX/PRO/DX INJ NEW DRUG ADDON: CPT

## 2022-02-16 RX ORDER — ONDANSETRON 2 MG/ML
4 INJECTION INTRAMUSCULAR; INTRAVENOUS ONCE
Status: COMPLETED | OUTPATIENT
Start: 2022-02-16 | End: 2022-02-16

## 2022-02-16 RX ORDER — FLUTICASONE PROPIONATE 50 MCG
1 SPRAY, SUSPENSION (ML) NASAL DAILY
Qty: 16 G | Refills: 0 | Status: SHIPPED | OUTPATIENT
Start: 2022-02-16 | End: 2022-02-16 | Stop reason: SDUPTHER

## 2022-02-16 RX ORDER — 0.9 % SODIUM CHLORIDE 0.9 %
1000 INTRAVENOUS SOLUTION INTRAVENOUS ONCE
Status: COMPLETED | OUTPATIENT
Start: 2022-02-16 | End: 2022-02-16

## 2022-02-16 RX ORDER — KETOROLAC TROMETHAMINE 30 MG/ML
30 INJECTION, SOLUTION INTRAMUSCULAR; INTRAVENOUS ONCE
Status: COMPLETED | OUTPATIENT
Start: 2022-02-16 | End: 2022-02-16

## 2022-02-16 RX ORDER — FLUTICASONE PROPIONATE 50 MCG
1 SPRAY, SUSPENSION (ML) NASAL DAILY
Qty: 16 G | Refills: 0 | Status: SHIPPED | OUTPATIENT
Start: 2022-02-16

## 2022-02-16 RX ADMIN — ONDANSETRON 4 MG: 2 INJECTION INTRAMUSCULAR; INTRAVENOUS at 09:56

## 2022-02-16 RX ADMIN — KETOROLAC TROMETHAMINE 30 MG: 30 INJECTION, SOLUTION INTRAMUSCULAR at 09:56

## 2022-02-16 RX ADMIN — SODIUM CHLORIDE 1000 ML: 9 INJECTION, SOLUTION INTRAVENOUS at 09:56

## 2022-02-16 ASSESSMENT — ENCOUNTER SYMPTOMS
VOMITING: 0
SORE THROAT: 0
NAUSEA: 0
SWOLLEN GLANDS: 0
PHOTOPHOBIA: 0
COUGH: 1
SINUS PRESSURE: 1
ABDOMINAL PAIN: 0
BLURRED VISION: 0
EYE PAIN: 0

## 2022-02-16 ASSESSMENT — PAIN SCALES - GENERAL
PAINLEVEL_OUTOF10: 10
PAINLEVEL_OUTOF10: 10

## 2022-02-16 ASSESSMENT — PAIN - FUNCTIONAL ASSESSMENT: PAIN_FUNCTIONAL_ASSESSMENT: 0-10

## 2022-02-16 NOTE — ED PROVIDER NOTES
[facility] ED  eMERGENCY dEPARTMENT eNCOUnter      Pt Name: Hilary River  MRN: [id number]  [place] 1960  Date of evaluation: 2/16/22      CHIEF COMPLAINT       Chief Complaint   Patient presents with    Headache    Dizziness         HISTORY OF PRESENT ILLNESS    Marie Padron is a 57 y.o. female who presents complaining of bilateral frontal headache congestion feels stuffy ears feel congested. Complaining of some dizziness. The history is provided by the patient. Headache  Pain location:  Generalized  Quality: pressure bilateral.  Radiates to:  Does not radiate  Onset quality:  Gradual  Duration:  1 week  Timing:  Intermittent  Progression:  Waxing and waning  Chronicity:  New  Relieved by:  Nothing  Worsened by:  Nothing  Ineffective treatments:  None tried  Associated symptoms: cough, dizziness, ear pain and sinus pressure    Associated symptoms: no abdominal pain, no blurred vision, no congestion, no eye pain, no facial pain, no fatigue, no fever, no hearing loss, no loss of balance, no nausea, no near-syncope, no neck pain, no neck stiffness, no numbness, no paresthesias, no photophobia, no sore throat, no swollen glands, no syncope, no vomiting and no weakness        REVIEW OF SYSTEMS       Review of Systems   Constitutional: Negative for fatigue and fever. HENT: Positive for ear pain and sinus pressure. Negative for congestion, hearing loss and sore throat. Eyes: Negative for blurred vision, photophobia and pain. Respiratory: Positive for cough. Cardiovascular: Negative for syncope and near-syncope. Gastrointestinal: Negative for abdominal pain, nausea and vomiting. Musculoskeletal: Negative for neck pain and neck stiffness. Neurological: Positive for dizziness and headaches. Negative for weakness, numbness, paresthesias and loss of balance. All other systems reviewed and are negative.       PAST MEDICAL HISTORY     Past Medical History:   Diagnosis Date    COVID-19     Diabetes mellitus ([facility] [address]        SURGICAL HISTORY       Past Surgical History:   Procedure Laterality Date    HYSTERECTOMY         CURRENT MEDICATIONS       Previous Medications    ALCOHOL SWABS (ALCOHOL PREP) 70 % PADS    1 each by Does not apply route 3 times daily    BLOOD GLUCOSE MONITOR STRIPS    Test 3 times a day & as needed for symptoms of irregular blood glucose. GLUCOSE MONITORING KIT (FREESTYLE) MONITORING KIT    1 kit by Does not apply route daily as needed (insulin measure)    IBUPROFEN (IBU) 800 MG TABLET    Take 1 tablet by mouth every 8 hours as needed for Pain    INSULIN GLARGINE (LANTUS SOLOSTAR) 100 UNIT/ML INJECTION PEN    Inject 20 Units into the skin daily    INSULIN PEN NEEDLE 31G X 5 MM MISC    1 each by Does not apply route daily    LANCETS MISC    1 each by Does not apply route daily    METFORMIN (GLUCOPHAGE) 1000 MG TABLET    Take 1 tablet by mouth 2 times daily (with meals)       ALLERGIES     has No Known Allergies. FAMILY HISTORY     has no family status information on file. SOCIAL HISTORY      reports that she has never smoked. She has never used smokeless tobacco. She reports current alcohol use. She reports that she does not use drugs. PHYSICAL EXAM     INITIAL VITALS: BP (!) 132/96   Pulse 84   Temp 98.3 °F (36.8 °C) (Oral)   Resp 16   Ht 5' 9\" (1.753 m)   Wt 214 lb 9.6 oz (97.3 kg)   SpO2 100%   BMI 31.69 kg/m²      Physical Exam  Vitals and nursing note reviewed. Constitutional:       Appearance: She is well-developed. HENT:      Head: Normocephalic and atraumatic. Eyes:      Pupils: Pupils are equal, round, and reactive to light. Cardiovascular:      Rate and Rhythm: Normal rate and regular rhythm. Heart sounds: Normal heart sounds. No murmur heard. Pulmonary:      Effort: Pulmonary effort is normal.      Breath sounds: Normal breath sounds. Abdominal:      General: Bowel sounds are normal.      Palpations: Abdomen is soft. Tenderness: There is no abdominal tenderness. Musculoskeletal:         General: Normal range of motion. Cervical back: Normal range of motion. Skin:     General: Skin is warm and dry. Neurological:      General: No focal deficit present. Mental Status: She is alert and oriented to person, place, and time. Cranial Nerves: No cranial nerve deficit. Sensory: No sensory deficit. Coordination: Coordination normal.      Gait: Gait normal.         MEDICAL DECISION MAKING:     Patient improved after IV fluids antiemetics and Toradol. CT shows sinusitis. Labs are reviewed blood sugar is elevated but she does have diabetes. On reexam patient is feeling better. Will discharge home with diagnosis of sinusitis. Recommend coolmist humidifier in room at night drink plenty of fluids try Flonase this may be beneficial warm compresses and close follow-up with primary care. If symptoms worsen or any other concerns return to the emergency department. DIAGNOSTIC RESULTS     EKG: All EKG's are interpreted by the Emergency Department Physician who either signs or Co-signs this chart in the absence of acardiologist.        RADIOLOGY:Allplain film, CT, MRI, and formal ultrasound images (except ED bedside ultrasound) are read by the radiologist and the images and interpretations are directly viewed by the emergency physician. LABS:All lab results were reviewed by myself, and all abnormals are listed below.   Labs Reviewed   CBC WITH AUTO DIFFERENTIAL - Abnormal; Notable for the following components:       Result Value    RDW 11.3 (*)     Immature Granulocytes 1 (*)     All other components within normal limits   BASIC METABOLIC PANEL W/ REFLEX TO MG FOR LOW K - Abnormal; Notable for the following components:    Glucose 207 (*)     All other components within normal limits         EMERGENCY DEPARTMENT COURSE:   Vitals:    Vitals:    02/16/22 0913 02/16/22 0915   BP:  (!) 132/96   Pulse: 84 Resp: 16    Temp: 98.3 °F (36.8 °C)    TempSrc: Oral    SpO2: 100%    Weight: 214 lb 9.6 oz (97.3 kg)    Height: 5' 9\" (1.753 m)        The patient was given the following medications while in the emergency department:  Orders Placed This Encounter   Medications    0.9 % sodium chloride bolus    ketorolac (TORADOL) injection 30 mg    ondansetron (ZOFRAN) injection 4 mg    fluticasone (FLONASE) 50 MCG/ACT nasal spray     Si spray by Each Nostril route daily     Dispense:  16 g     Refill:  0       -------------------------      CRITICAL CARE:     CONSULTS:  None    PROCEDURES:  Procedures    FINAL IMPRESSION      1.  Acute frontal sinusitis, recurrence not specified          DISPOSITION/PLAN   DISPOSITION        PATIENT REFERREDTO:  MD ANGIE Levine [address]  [address] [place] [address]  [phone]    Schedule an appointment as soon as possible for a visit in 2 days      [facility] ED  [address] [facility]  [phone]    If symptoms worsen      DISCHARGEMEDICATIONS:  New Prescriptions    FLUTICASONE (FLONASE) 50 MCG/ACT NASAL SPRAY    1 spray by Each Nostril route daily       (Please note that portions of this note were completed with a voice recognition program.  Efforts were made to edit thedictations but occasionally words are mis-transcribed.)    ADILIA Prajapati PA-C  [phone]

## 2022-02-16 NOTE — ED PROVIDER NOTES
eMERGENCY dEPARTMENT eNCOUnter   Independent Attestation     Pt Name: Teresita Blair  MRN: [id number]  [place] 1960  Date of evaluation: 2/16/22     Marie Menjivar is a 57 y.o. female with CC: Headache and Dizziness        This visit was performed by both a physician and an APC. I performed all aspects of the MDM as documented. The care is provided during an unprecedented national emergency due to the novel coronavirus, COVID 19.     Sumi Valdez MD  Attending Emergency Physician         Dontae De Guzman MD  02/16/22 6512